# Patient Record
Sex: FEMALE | Race: WHITE | Employment: PART TIME | URBAN - METROPOLITAN AREA
[De-identification: names, ages, dates, MRNs, and addresses within clinical notes are randomized per-mention and may not be internally consistent; named-entity substitution may affect disease eponyms.]

---

## 2017-12-04 PROBLEM — Z34.93 ENCOUNTER FOR PREGNANCY RELATED EXAMINATION IN THIRD TRIMESTER: Status: ACTIVE | Noted: 2017-12-04

## 2017-12-07 PROBLEM — O46.93 PREGNANCY WITH THIRD TRIMESTER BLEEDING, ANTEPARTUM: Status: ACTIVE | Noted: 2017-12-07

## 2017-12-07 PROBLEM — Z37.9 NORMAL LABOR: Status: ACTIVE | Noted: 2017-12-07

## 2017-12-07 PROBLEM — Z34.93 ENCOUNTER FOR PREGNANCY RELATED EXAMINATION IN THIRD TRIMESTER: Status: RESOLVED | Noted: 2017-12-04 | Resolved: 2017-12-07

## 2017-12-07 PROBLEM — Z3A.40 40 WEEKS GESTATION OF PREGNANCY: Status: ACTIVE | Noted: 2017-12-07

## 2018-10-04 ENCOUNTER — HOSPITAL ENCOUNTER (OUTPATIENT)
Age: 28
Discharge: HOME | End: 2018-10-04
Attending: OBSTETRICS & GYNECOLOGY | Admitting: OBSTETRICS & GYNECOLOGY
Payer: COMMERCIAL

## 2018-10-04 VITALS
HEART RATE: 83 BPM | BODY MASS INDEX: 45.32 KG/M2 | RESPIRATION RATE: 18 BRPM | DIASTOLIC BLOOD PRESSURE: 74 MMHG | TEMPERATURE: 97.5 F | SYSTOLIC BLOOD PRESSURE: 105 MMHG | WEIGHT: 272 LBS | HEIGHT: 65 IN

## 2018-10-04 PROBLEM — Z32.01 PREGNANCY EXAMINATION OR TEST, POSITIVE RESULT: Status: ACTIVE | Noted: 2018-10-04

## 2018-10-04 PROCEDURE — 99211 OFF/OP EST MAY X REQ PHY/QHP: CPT

## 2018-10-04 RX ORDER — PNV,CALCIUM 72/IRON/FOLIC ACID 27 MG-1 MG
TABLET ORAL
COMMUNITY
Start: 2018-09-19 | End: 2021-06-08 | Stop reason: ALTCHOICE

## 2018-10-04 NOTE — FLOWSHEET NOTE
TR to Dr. Andre Boles. Informed of pt presence, c/o, assessment, FHR and history. Discharge order received. Pt to follow up in the office next week. Simran Garcia

## 2019-01-09 ENCOUNTER — HOSPITAL ENCOUNTER (OUTPATIENT)
Age: 29
Discharge: HOME | End: 2019-01-09
Attending: OBSTETRICS & GYNECOLOGY | Admitting: OBSTETRICS & GYNECOLOGY
Payer: COMMERCIAL

## 2019-01-09 VITALS
HEART RATE: 81 BPM | WEIGHT: 273 LBS | TEMPERATURE: 97 F | BODY MASS INDEX: 45.48 KG/M2 | DIASTOLIC BLOOD PRESSURE: 60 MMHG | RESPIRATION RATE: 16 BRPM | SYSTOLIC BLOOD PRESSURE: 116 MMHG | HEIGHT: 65 IN

## 2019-01-09 PROBLEM — Z32.02 PREGNANCY EXAMINATION OR TEST, NEGATIVE RESULT: Status: ACTIVE | Noted: 2019-01-09

## 2019-01-09 LAB
AMPHETAMINES: NEGATIVE
BACTERIA: ABNORMAL /HPF
BARBITURATE SCREEN URINE: NEGATIVE
BENZODIAZEPINE SCREEN, URINE: NEGATIVE
BILIRUBIN URINE: NEGATIVE MG/DL
BLOOD, URINE: NEGATIVE
CANNABINOID SCREEN URINE: NEGATIVE
CLARITY: ABNORMAL
COCAINE METABOLITE: NEGATIVE
COLOR: YELLOW
GLUCOSE, URINE: NEGATIVE MG/DL
KETONES, URINE: NEGATIVE MG/DL
LEUKOCYTE ESTERASE, URINE: ABNORMAL
MUCUS: ABNORMAL HPF
NITRITE URINE, QUANTITATIVE: NEGATIVE
OPIATES, URINE: NEGATIVE
OXYCODONE: NEGATIVE
PH, URINE: 6 (ref 5–8)
PHENCYCLIDINE, URINE: NEGATIVE
PROTEIN UA: NEGATIVE MG/DL
RBC URINE: <1 /HPF (ref 0–6)
SPECIFIC GRAVITY UA: 1.01 (ref 1–1.03)
SQUAMOUS EPITHELIAL: 18 /HPF
TRICHOMONAS: ABNORMAL /HPF
UROBILINOGEN, URINE: NORMAL MG/DL (ref 0.2–1)
WBC UA: 5 /HPF (ref 0–5)

## 2019-01-09 PROCEDURE — 81001 URINALYSIS AUTO W/SCOPE: CPT

## 2019-01-09 PROCEDURE — 99211 OFF/OP EST MAY X REQ PHY/QHP: CPT

## 2019-01-09 PROCEDURE — 80307 DRUG TEST PRSMV CHEM ANLYZR: CPT

## 2019-03-08 ENCOUNTER — ANESTHESIA EVENT (OUTPATIENT)
Dept: LABOR AND DELIVERY | Age: 29
End: 2019-03-08
Payer: COMMERCIAL

## 2019-03-09 ENCOUNTER — HOSPITAL ENCOUNTER (INPATIENT)
Age: 29
LOS: 3 days | Discharge: HOME | End: 2019-03-12
Attending: OBSTETRICS & GYNECOLOGY | Admitting: OBSTETRICS & GYNECOLOGY
Payer: COMMERCIAL

## 2019-03-09 ENCOUNTER — ANESTHESIA (OUTPATIENT)
Dept: LABOR AND DELIVERY | Age: 29
End: 2019-03-09
Payer: COMMERCIAL

## 2019-03-09 VITALS — SYSTOLIC BLOOD PRESSURE: 132 MMHG | OXYGEN SATURATION: 100 % | DIASTOLIC BLOOD PRESSURE: 55 MMHG | TEMPERATURE: 96.8 F

## 2019-03-09 DIAGNOSIS — G89.18 POST-OPERATIVE PAIN: Primary | ICD-10-CM

## 2019-03-09 PROBLEM — Z3A.39 39 WEEKS GESTATION OF PREGNANCY: Status: ACTIVE | Noted: 2019-03-09

## 2019-03-09 PROBLEM — O09.299 HX OF POSTPARTUM HEMORRHAGE, CURRENTLY PREGNANT: Status: ACTIVE | Noted: 2019-03-09

## 2019-03-09 PROBLEM — O36.60X0 LGA (LARGE FOR GESTATIONAL AGE) FETUS AFFECTING MANAGEMENT OF MOTHER: Status: ACTIVE | Noted: 2019-03-09

## 2019-03-09 LAB
AMPHETAMINES: NEGATIVE
BACTERIA: ABNORMAL /HPF
BARBITURATE SCREEN URINE: NEGATIVE
BENZODIAZEPINE SCREEN, URINE: NEGATIVE
BILIRUBIN URINE: NEGATIVE MG/DL
BLOOD, URINE: NEGATIVE
CANNABINOID SCREEN URINE: NEGATIVE
CLARITY: ABNORMAL
COCAINE METABOLITE: NEGATIVE
COLOR: YELLOW
GLUCOSE, URINE: NEGATIVE MG/DL
HCT VFR BLD CALC: 35.6 % (ref 37–47)
HCT VFR BLD CALC: 41.9 % (ref 37–47)
HEMOGLOBIN: 11.6 GM/DL (ref 12.5–16)
HEMOGLOBIN: 13.9 GM/DL (ref 12.5–16)
KETONES, URINE: ABNORMAL MG/DL
LEUKOCYTE ESTERASE, URINE: ABNORMAL
MCH RBC QN AUTO: 28.9 PG (ref 27–31)
MCHC RBC AUTO-ENTMCNC: 33.2 % (ref 32–36)
MCV RBC AUTO: 87.1 FL (ref 78–100)
MUCUS: ABNORMAL HPF
NITRITE URINE, QUANTITATIVE: NEGATIVE
OPIATES, URINE: NEGATIVE
OXYCODONE: NEGATIVE
PDW BLD-RTO: 14.3 % (ref 11.7–14.9)
PH, URINE: 6 (ref 5–8)
PHENCYCLIDINE, URINE: NEGATIVE
PLATELET # BLD: 181 K/CU MM (ref 140–440)
PMV BLD AUTO: 11.1 FL (ref 7.5–11.1)
PROTEIN UA: NEGATIVE MG/DL
RBC # BLD: 4.81 M/CU MM (ref 4.2–5.4)
RBC URINE: 5 /HPF (ref 0–6)
SPECIFIC GRAVITY UA: 1.01 (ref 1–1.03)
SQUAMOUS EPITHELIAL: 36 /HPF
TRICHOMONAS: ABNORMAL /HPF
UROBILINOGEN, URINE: NORMAL MG/DL (ref 0.2–1)
WBC # BLD: 9.2 K/CU MM (ref 4–10.5)
WBC CLUMP: ABNORMAL /HPF
WBC UA: 14 /HPF (ref 0–5)

## 2019-03-09 PROCEDURE — 85018 HEMOGLOBIN: CPT

## 2019-03-09 PROCEDURE — 3700000000 HC ANESTHESIA ATTENDED CARE: Performed by: OBSTETRICS & GYNECOLOGY

## 2019-03-09 PROCEDURE — 2500000003 HC RX 250 WO HCPCS: Performed by: NURSE ANESTHETIST, CERTIFIED REGISTERED

## 2019-03-09 PROCEDURE — 86900 BLOOD TYPING SEROLOGIC ABO: CPT

## 2019-03-09 PROCEDURE — 86901 BLOOD TYPING SEROLOGIC RH(D): CPT

## 2019-03-09 PROCEDURE — 86850 RBC ANTIBODY SCREEN: CPT

## 2019-03-09 PROCEDURE — 36415 COLL VENOUS BLD VENIPUNCTURE: CPT

## 2019-03-09 PROCEDURE — 2580000003 HC RX 258: Performed by: OBSTETRICS & GYNECOLOGY

## 2019-03-09 PROCEDURE — 81001 URINALYSIS AUTO W/SCOPE: CPT

## 2019-03-09 PROCEDURE — 7100000001 HC PACU RECOVERY - ADDTL 15 MIN: Performed by: OBSTETRICS & GYNECOLOGY

## 2019-03-09 PROCEDURE — 3609079900 HC CESAREAN SECTION: Performed by: OBSTETRICS & GYNECOLOGY

## 2019-03-09 PROCEDURE — 85014 HEMATOCRIT: CPT

## 2019-03-09 PROCEDURE — 3700000001 HC ADD 15 MINUTES (ANESTHESIA): Performed by: OBSTETRICS & GYNECOLOGY

## 2019-03-09 PROCEDURE — 7100000000 HC PACU RECOVERY - FIRST 15 MIN: Performed by: OBSTETRICS & GYNECOLOGY

## 2019-03-09 PROCEDURE — 80307 DRUG TEST PRSMV CHEM ANLYZR: CPT

## 2019-03-09 PROCEDURE — 6370000000 HC RX 637 (ALT 250 FOR IP): Performed by: OBSTETRICS & GYNECOLOGY

## 2019-03-09 PROCEDURE — 6360000002 HC RX W HCPCS: Performed by: OBSTETRICS & GYNECOLOGY

## 2019-03-09 PROCEDURE — 2580000003 HC RX 258

## 2019-03-09 PROCEDURE — 85027 COMPLETE CBC AUTOMATED: CPT

## 2019-03-09 PROCEDURE — 2580000003 HC RX 258: Performed by: NURSE ANESTHETIST, CERTIFIED REGISTERED

## 2019-03-09 PROCEDURE — 1220000000 HC SEMI PRIVATE OB R&B

## 2019-03-09 PROCEDURE — 6360000002 HC RX W HCPCS: Performed by: NURSE ANESTHETIST, CERTIFIED REGISTERED

## 2019-03-09 RX ORDER — LANOLIN 100 %
OINTMENT (GRAM) TOPICAL
Status: DISCONTINUED | OUTPATIENT
Start: 2019-03-09 | End: 2019-03-12 | Stop reason: HOSPADM

## 2019-03-09 RX ORDER — SODIUM CHLORIDE, SODIUM LACTATE, POTASSIUM CHLORIDE, CALCIUM CHLORIDE 600; 310; 30; 20 MG/100ML; MG/100ML; MG/100ML; MG/100ML
INJECTION, SOLUTION INTRAVENOUS CONTINUOUS PRN
Status: DISCONTINUED | OUTPATIENT
Start: 2019-03-09 | End: 2019-03-09 | Stop reason: SDUPTHER

## 2019-03-09 RX ORDER — IBUPROFEN 800 MG/1
800 TABLET ORAL EVERY 8 HOURS PRN
Status: DISCONTINUED | OUTPATIENT
Start: 2019-03-09 | End: 2019-03-12 | Stop reason: HOSPADM

## 2019-03-09 RX ORDER — OXYCODONE HYDROCHLORIDE AND ACETAMINOPHEN 5; 325 MG/1; MG/1
1 TABLET ORAL EVERY 4 HOURS PRN
Status: DISCONTINUED | OUTPATIENT
Start: 2019-03-09 | End: 2019-03-12 | Stop reason: HOSPADM

## 2019-03-09 RX ORDER — BISACODYL 10 MG
10 SUPPOSITORY, RECTAL RECTAL DAILY PRN
Status: DISCONTINUED | OUTPATIENT
Start: 2019-03-09 | End: 2019-03-12 | Stop reason: HOSPADM

## 2019-03-09 RX ORDER — SODIUM CHLORIDE, SODIUM LACTATE, POTASSIUM CHLORIDE, CALCIUM CHLORIDE 600; 310; 30; 20 MG/100ML; MG/100ML; MG/100ML; MG/100ML
INJECTION, SOLUTION INTRAVENOUS CONTINUOUS
Status: DISCONTINUED | OUTPATIENT
Start: 2019-03-09 | End: 2019-03-12 | Stop reason: HOSPADM

## 2019-03-09 RX ORDER — SODIUM CHLORIDE, SODIUM LACTATE, POTASSIUM CHLORIDE, AND CALCIUM CHLORIDE .6; .31; .03; .02 G/100ML; G/100ML; G/100ML; G/100ML
1000 INJECTION, SOLUTION INTRAVENOUS ONCE
Status: COMPLETED | OUTPATIENT
Start: 2019-03-09 | End: 2019-03-09

## 2019-03-09 RX ORDER — FERROUS SULFATE 325(65) MG
325 TABLET ORAL 2 TIMES DAILY WITH MEALS
Status: DISCONTINUED | OUTPATIENT
Start: 2019-03-09 | End: 2019-03-12 | Stop reason: HOSPADM

## 2019-03-09 RX ORDER — SODIUM CHLORIDE 0.9 % (FLUSH) 0.9 %
10 SYRINGE (ML) INJECTION PRN
Status: DISCONTINUED | OUTPATIENT
Start: 2019-03-09 | End: 2019-03-12 | Stop reason: HOSPADM

## 2019-03-09 RX ORDER — ONDANSETRON 2 MG/ML
INJECTION INTRAMUSCULAR; INTRAVENOUS PRN
Status: DISCONTINUED | OUTPATIENT
Start: 2019-03-09 | End: 2019-03-09

## 2019-03-09 RX ORDER — KETOROLAC TROMETHAMINE 30 MG/ML
INJECTION, SOLUTION INTRAMUSCULAR; INTRAVENOUS PRN
Status: DISCONTINUED | OUTPATIENT
Start: 2019-03-09 | End: 2019-03-09

## 2019-03-09 RX ORDER — KETOROLAC TROMETHAMINE 30 MG/ML
30 INJECTION, SOLUTION INTRAMUSCULAR; INTRAVENOUS EVERY 6 HOURS PRN
Status: DISCONTINUED | OUTPATIENT
Start: 2019-03-09 | End: 2019-03-09

## 2019-03-09 RX ORDER — CEFAZOLIN SODIUM 2 G/100ML
2 INJECTION, SOLUTION INTRAVENOUS ONCE
Status: DISCONTINUED | OUTPATIENT
Start: 2019-03-09 | End: 2019-03-09

## 2019-03-09 RX ORDER — METHYLERGONOVINE MALEATE 0.2 MG/ML
200 INJECTION INTRAVENOUS PRN
Status: DISCONTINUED | OUTPATIENT
Start: 2019-03-09 | End: 2019-03-12 | Stop reason: HOSPADM

## 2019-03-09 RX ORDER — OXYCODONE HYDROCHLORIDE AND ACETAMINOPHEN 5; 325 MG/1; MG/1
2 TABLET ORAL EVERY 4 HOURS PRN
Status: DISCONTINUED | OUTPATIENT
Start: 2019-03-09 | End: 2019-03-12 | Stop reason: HOSPADM

## 2019-03-09 RX ORDER — ONDANSETRON 4 MG/1
8 TABLET, ORALLY DISINTEGRATING ORAL EVERY 8 HOURS PRN
Status: DISCONTINUED | OUTPATIENT
Start: 2019-03-09 | End: 2019-03-12 | Stop reason: HOSPADM

## 2019-03-09 RX ORDER — METHYLERGONOVINE MALEATE 0.2 MG/ML
200 INJECTION INTRAVENOUS PRN
Status: DISCONTINUED | OUTPATIENT
Start: 2019-03-09 | End: 2019-03-09

## 2019-03-09 RX ORDER — ONDANSETRON 2 MG/ML
4 INJECTION INTRAMUSCULAR; INTRAVENOUS EVERY 6 HOURS PRN
Status: DISCONTINUED | OUTPATIENT
Start: 2019-03-09 | End: 2019-03-09

## 2019-03-09 RX ORDER — BUPIVACAINE HYDROCHLORIDE 7.5 MG/ML
INJECTION, SOLUTION INTRASPINAL PRN
Status: DISCONTINUED | OUTPATIENT
Start: 2019-03-09 | End: 2019-03-09 | Stop reason: SDUPTHER

## 2019-03-09 RX ORDER — SODIUM CHLORIDE, SODIUM LACTATE, POTASSIUM CHLORIDE, CALCIUM CHLORIDE 600; 310; 30; 20 MG/100ML; MG/100ML; MG/100ML; MG/100ML
INJECTION, SOLUTION INTRAVENOUS
Status: COMPLETED
Start: 2019-03-09 | End: 2019-03-09

## 2019-03-09 RX ORDER — SIMETHICONE 80 MG
80 TABLET,CHEWABLE ORAL EVERY 6 HOURS PRN
Status: DISCONTINUED | OUTPATIENT
Start: 2019-03-09 | End: 2019-03-12 | Stop reason: HOSPADM

## 2019-03-09 RX ORDER — EPHEDRINE SULFATE 50 MG/ML
INJECTION, SOLUTION INTRAVENOUS PRN
Status: DISCONTINUED | OUTPATIENT
Start: 2019-03-09 | End: 2019-03-09 | Stop reason: SDUPTHER

## 2019-03-09 RX ORDER — NALBUPHINE HCL 10 MG/ML
5 AMPUL (ML) INJECTION
Status: DISCONTINUED | OUTPATIENT
Start: 2019-03-09 | End: 2019-03-12 | Stop reason: HOSPADM

## 2019-03-09 RX ORDER — SODIUM CHLORIDE 0.9 % (FLUSH) 0.9 %
10 SYRINGE (ML) INJECTION EVERY 12 HOURS SCHEDULED
Status: DISCONTINUED | OUTPATIENT
Start: 2019-03-09 | End: 2019-03-12 | Stop reason: HOSPADM

## 2019-03-09 RX ORDER — MORPHINE SULFATE 0.5 MG/ML
INJECTION, SOLUTION EPIDURAL; INTRATHECAL; INTRAVENOUS PRN
Status: DISCONTINUED | OUTPATIENT
Start: 2019-03-09 | End: 2019-03-09 | Stop reason: SDUPTHER

## 2019-03-09 RX ORDER — TRISODIUM CITRATE DIHYDRATE AND CITRIC ACID MONOHYDRATE 500; 334 MG/5ML; MG/5ML
30 SOLUTION ORAL ONCE
Status: COMPLETED | OUTPATIENT
Start: 2019-03-09 | End: 2019-03-09

## 2019-03-09 RX ORDER — DOCUSATE SODIUM 100 MG/1
100 CAPSULE, LIQUID FILLED ORAL 2 TIMES DAILY
Status: DISCONTINUED | OUTPATIENT
Start: 2019-03-09 | End: 2019-03-12 | Stop reason: HOSPADM

## 2019-03-09 RX ORDER — SODIUM CHLORIDE 0.9 % (FLUSH) 0.9 %
10 SYRINGE (ML) INJECTION PRN
Status: DISCONTINUED | OUTPATIENT
Start: 2019-03-09 | End: 2019-03-09 | Stop reason: HOSPADM

## 2019-03-09 RX ADMIN — DOCUSATE SODIUM 100 MG: 100 CAPSULE, LIQUID FILLED ORAL at 20:21

## 2019-03-09 RX ADMIN — NALBUPHINE HYDROCHLORIDE 5 MG: 10 INJECTION, SOLUTION INTRAMUSCULAR; INTRAVENOUS; SUBCUTANEOUS at 21:08

## 2019-03-09 RX ADMIN — ONDANSETRON 4 MG: 2 INJECTION INTRAMUSCULAR; INTRAVENOUS at 07:30

## 2019-03-09 RX ADMIN — Medication 999 ML/HR: at 08:22

## 2019-03-09 RX ADMIN — BUPIVACAINE HYDROCHLORIDE IN DEXTROSE 1.6 ML: 7.5 INJECTION, SOLUTION SUBARACHNOID at 07:55

## 2019-03-09 RX ADMIN — SODIUM CHLORIDE, SODIUM LACTATE, POTASSIUM CHLORIDE, AND CALCIUM CHLORIDE 1000 ML: .6; .31; .03; .02 INJECTION, SOLUTION INTRAVENOUS at 06:29

## 2019-03-09 RX ADMIN — MORPHINE SULFATE 4.8 MG: 0.5 INJECTION, SOLUTION EPIDURAL; INTRATHECAL; INTRAVENOUS at 08:27

## 2019-03-09 RX ADMIN — SODIUM CITRATE AND CITRIC ACID MONOHYDRATE 30 ML: 500; 334 SOLUTION ORAL at 07:28

## 2019-03-09 RX ADMIN — NALBUPHINE HYDROCHLORIDE 5 MG: 10 INJECTION, SOLUTION INTRAMUSCULAR; INTRAVENOUS; SUBCUTANEOUS at 15:17

## 2019-03-09 RX ADMIN — KETOROLAC TROMETHAMINE 30 MG: 30 INJECTION, SOLUTION INTRAMUSCULAR; INTRAVENOUS at 08:43

## 2019-03-09 RX ADMIN — EPHEDRINE SULFATE 10 MG: 50 INJECTION, SOLUTION INTRAMUSCULAR; INTRAVENOUS; SUBCUTANEOUS at 08:25

## 2019-03-09 RX ADMIN — SODIUM CHLORIDE, POTASSIUM CHLORIDE, SODIUM LACTATE AND CALCIUM CHLORIDE 1000 ML: 600; 310; 30; 20 INJECTION, SOLUTION INTRAVENOUS at 06:29

## 2019-03-09 RX ADMIN — MORPHINE SULFATE 0.2 MG: 0.5 INJECTION, SOLUTION EPIDURAL; INTRATHECAL; INTRAVENOUS at 07:55

## 2019-03-09 RX ADMIN — SODIUM CHLORIDE, POTASSIUM CHLORIDE, SODIUM LACTATE AND CALCIUM CHLORIDE: 600; 310; 30; 20 INJECTION, SOLUTION INTRAVENOUS at 09:45

## 2019-03-09 RX ADMIN — EPHEDRINE SULFATE 10 MG: 50 INJECTION, SOLUTION INTRAMUSCULAR; INTRAVENOUS; SUBCUTANEOUS at 08:30

## 2019-03-09 RX ADMIN — NALBUPHINE HYDROCHLORIDE 5 MG: 10 INJECTION, SOLUTION INTRAMUSCULAR; INTRAVENOUS; SUBCUTANEOUS at 10:45

## 2019-03-09 RX ADMIN — DEXTROSE MONOHYDRATE 3 G: 50 INJECTION, SOLUTION INTRAVENOUS at 07:32

## 2019-03-09 RX ADMIN — SODIUM CHLORIDE, POTASSIUM CHLORIDE, SODIUM LACTATE AND CALCIUM CHLORIDE: 600; 310; 30; 20 INJECTION, SOLUTION INTRAVENOUS at 20:21

## 2019-03-09 RX ADMIN — SODIUM CHLORIDE, POTASSIUM CHLORIDE, SODIUM LACTATE AND CALCIUM CHLORIDE: 600; 310; 30; 20 INJECTION, SOLUTION INTRAVENOUS at 13:04

## 2019-03-09 RX ADMIN — SODIUM CHLORIDE, POTASSIUM CHLORIDE, SODIUM LACTATE AND CALCIUM CHLORIDE: 600; 310; 30; 20 INJECTION, SOLUTION INTRAVENOUS at 07:48

## 2019-03-09 RX ADMIN — EPHEDRINE SULFATE 15 MG: 50 INJECTION, SOLUTION INTRAMUSCULAR; INTRAVENOUS; SUBCUTANEOUS at 08:08

## 2019-03-09 RX ADMIN — EPHEDRINE SULFATE 15 MG: 50 INJECTION, SOLUTION INTRAMUSCULAR; INTRAVENOUS; SUBCUTANEOUS at 08:00

## 2019-03-09 RX ADMIN — ONDANSETRON 4 MG: 2 INJECTION INTRAMUSCULAR; INTRAVENOUS at 08:43

## 2019-03-09 ASSESSMENT — PAIN SCALES - GENERAL
PAINLEVEL_OUTOF10: 4
PAINLEVEL_OUTOF10: 1
PAINLEVEL_OUTOF10: 0
PAINLEVEL_OUTOF10: 4
PAINLEVEL_OUTOF10: 2
PAINLEVEL_OUTOF10: 2

## 2019-03-09 ASSESSMENT — PAIN DESCRIPTION - LOCATION: LOCATION: ABDOMEN

## 2019-03-09 ASSESSMENT — PAIN DESCRIPTION - PROGRESSION: CLINICAL_PROGRESSION: NOT CHANGED

## 2019-03-09 ASSESSMENT — PULMONARY FUNCTION TESTS
PIF_VALUE: 0
PIF_VALUE: 1
PIF_VALUE: 0
PIF_VALUE: 1
PIF_VALUE: 0

## 2019-03-09 ASSESSMENT — PAIN DESCRIPTION - DESCRIPTORS: DESCRIPTORS: BURNING

## 2019-03-09 ASSESSMENT — PAIN DESCRIPTION - PAIN TYPE: TYPE: SURGICAL PAIN

## 2019-03-09 ASSESSMENT — PAIN - FUNCTIONAL ASSESSMENT: PAIN_FUNCTIONAL_ASSESSMENT: ACTIVITIES ARE NOT PREVENTED

## 2019-03-09 ASSESSMENT — PAIN DESCRIPTION - ORIENTATION: ORIENTATION: LOWER

## 2019-03-09 ASSESSMENT — PAIN DESCRIPTION - FREQUENCY: FREQUENCY: INTERMITTENT

## 2019-03-09 ASSESSMENT — PAIN DESCRIPTION - ONSET: ONSET: GRADUAL

## 2019-03-10 LAB
BASOPHILS ABSOLUTE: 0 K/CU MM
BASOPHILS RELATIVE PERCENT: 0.5 % (ref 0–1)
DIFFERENTIAL TYPE: ABNORMAL
EOSINOPHILS ABSOLUTE: 0.1 K/CU MM
EOSINOPHILS RELATIVE PERCENT: 0.9 % (ref 0–3)
HCT VFR BLD CALC: 37.7 % (ref 37–47)
HEMOGLOBIN: 11.9 GM/DL (ref 12.5–16)
IMMATURE NEUTROPHIL %: 0.7 % (ref 0–0.43)
LYMPHOCYTES ABSOLUTE: 2.1 K/CU MM
LYMPHOCYTES RELATIVE PERCENT: 24.1 % (ref 24–44)
MCH RBC QN AUTO: 28.6 PG (ref 27–31)
MCHC RBC AUTO-ENTMCNC: 31.6 % (ref 32–36)
MCV RBC AUTO: 90.6 FL (ref 78–100)
MONOCYTES ABSOLUTE: 0.5 K/CU MM
MONOCYTES RELATIVE PERCENT: 6.2 % (ref 0–4)
NUCLEATED RBC %: 0 %
PDW BLD-RTO: 14.6 % (ref 11.7–14.9)
PLATELET # BLD: 169 K/CU MM (ref 140–440)
PMV BLD AUTO: 11.6 FL (ref 7.5–11.1)
RBC # BLD: 4.16 M/CU MM (ref 4.2–5.4)
SEGMENTED NEUTROPHILS ABSOLUTE COUNT: 5.9 K/CU MM
SEGMENTED NEUTROPHILS RELATIVE PERCENT: 67.6 % (ref 36–66)
TOTAL IMMATURE NEUTOROPHIL: 0.06 K/CU MM
TOTAL NUCLEATED RBC: 0 K/CU MM
WBC # BLD: 8.8 K/CU MM (ref 4–10.5)

## 2019-03-10 PROCEDURE — 1220000000 HC SEMI PRIVATE OB R&B

## 2019-03-10 PROCEDURE — 6360000002 HC RX W HCPCS: Performed by: OBSTETRICS & GYNECOLOGY

## 2019-03-10 PROCEDURE — 6370000000 HC RX 637 (ALT 250 FOR IP): Performed by: OBSTETRICS & GYNECOLOGY

## 2019-03-10 PROCEDURE — 36415 COLL VENOUS BLD VENIPUNCTURE: CPT

## 2019-03-10 PROCEDURE — 85025 COMPLETE CBC W/AUTO DIFF WBC: CPT

## 2019-03-10 RX ADMIN — OXYCODONE AND ACETAMINOPHEN 1 TABLET: 5; 325 TABLET ORAL at 10:55

## 2019-03-10 RX ADMIN — SIMETHICONE CHEW TAB 80 MG 80 MG: 80 TABLET ORAL at 20:47

## 2019-03-10 RX ADMIN — ENOXAPARIN SODIUM 40 MG: 40 INJECTION SUBCUTANEOUS at 20:47

## 2019-03-10 RX ADMIN — DOCUSATE SODIUM 100 MG: 100 CAPSULE, LIQUID FILLED ORAL at 08:50

## 2019-03-10 RX ADMIN — OXYCODONE AND ACETAMINOPHEN 1 TABLET: 5; 325 TABLET ORAL at 20:47

## 2019-03-10 RX ADMIN — ENOXAPARIN SODIUM 40 MG: 40 INJECTION SUBCUTANEOUS at 00:00

## 2019-03-10 RX ADMIN — IBUPROFEN 800 MG: 800 TABLET, FILM COATED ORAL at 08:51

## 2019-03-10 RX ADMIN — IBUPROFEN 800 MG: 800 TABLET, FILM COATED ORAL at 16:49

## 2019-03-10 RX ADMIN — OXYCODONE AND ACETAMINOPHEN 2 TABLET: 5; 325 TABLET ORAL at 05:24

## 2019-03-10 RX ADMIN — OXYCODONE AND ACETAMINOPHEN 1 TABLET: 5; 325 TABLET ORAL at 15:14

## 2019-03-10 RX ADMIN — DOCUSATE SODIUM 100 MG: 100 CAPSULE, LIQUID FILLED ORAL at 20:47

## 2019-03-10 RX ADMIN — OXYCODONE AND ACETAMINOPHEN 2 TABLET: 5; 325 TABLET ORAL at 00:12

## 2019-03-10 RX ADMIN — SIMETHICONE CHEW TAB 80 MG 80 MG: 80 TABLET ORAL at 08:52

## 2019-03-10 ASSESSMENT — PAIN SCALES - GENERAL
PAINLEVEL_OUTOF10: 3
PAINLEVEL_OUTOF10: 2
PAINLEVEL_OUTOF10: 4
PAINLEVEL_OUTOF10: 7
PAINLEVEL_OUTOF10: 5
PAINLEVEL_OUTOF10: 1
PAINLEVEL_OUTOF10: 4
PAINLEVEL_OUTOF10: 7
PAINLEVEL_OUTOF10: 2

## 2019-03-10 ASSESSMENT — PAIN DESCRIPTION - ORIENTATION
ORIENTATION: LOWER

## 2019-03-10 ASSESSMENT — PAIN - FUNCTIONAL ASSESSMENT
PAIN_FUNCTIONAL_ASSESSMENT: ACTIVITIES ARE NOT PREVENTED
PAIN_FUNCTIONAL_ASSESSMENT: PREVENTS OR INTERFERES SOME ACTIVE ACTIVITIES AND ADLS

## 2019-03-10 ASSESSMENT — PAIN DESCRIPTION - PAIN TYPE
TYPE: SURGICAL PAIN
TYPE: SURGICAL PAIN

## 2019-03-10 ASSESSMENT — PAIN DESCRIPTION - DESCRIPTORS
DESCRIPTORS: BURNING

## 2019-03-10 ASSESSMENT — PAIN DESCRIPTION - LOCATION
LOCATION: ABDOMEN

## 2019-03-10 ASSESSMENT — PAIN DESCRIPTION - FREQUENCY
FREQUENCY: INTERMITTENT
FREQUENCY: INTERMITTENT

## 2019-03-10 ASSESSMENT — PAIN DESCRIPTION - ONSET
ONSET: ON-GOING
ONSET: ON-GOING

## 2019-03-11 PROCEDURE — 6360000002 HC RX W HCPCS: Performed by: OBSTETRICS & GYNECOLOGY

## 2019-03-11 PROCEDURE — 94761 N-INVAS EAR/PLS OXIMETRY MLT: CPT

## 2019-03-11 PROCEDURE — 1220000000 HC SEMI PRIVATE OB R&B

## 2019-03-11 PROCEDURE — 6370000000 HC RX 637 (ALT 250 FOR IP): Performed by: OBSTETRICS & GYNECOLOGY

## 2019-03-11 RX ADMIN — SIMETHICONE CHEW TAB 80 MG 80 MG: 80 TABLET ORAL at 08:36

## 2019-03-11 RX ADMIN — DOCUSATE SODIUM 100 MG: 100 CAPSULE, LIQUID FILLED ORAL at 21:18

## 2019-03-11 RX ADMIN — IBUPROFEN 800 MG: 800 TABLET, FILM COATED ORAL at 15:43

## 2019-03-11 RX ADMIN — DOCUSATE SODIUM 100 MG: 100 CAPSULE, LIQUID FILLED ORAL at 08:35

## 2019-03-11 RX ADMIN — OXYCODONE AND ACETAMINOPHEN 1 TABLET: 5; 325 TABLET ORAL at 13:48

## 2019-03-11 RX ADMIN — IBUPROFEN 800 MG: 800 TABLET, FILM COATED ORAL at 02:12

## 2019-03-11 RX ADMIN — ENOXAPARIN SODIUM 40 MG: 40 INJECTION SUBCUTANEOUS at 21:18

## 2019-03-11 RX ADMIN — OXYCODONE AND ACETAMINOPHEN 1 TABLET: 5; 325 TABLET ORAL at 08:40

## 2019-03-11 RX ADMIN — OXYCODONE AND ACETAMINOPHEN 2 TABLET: 5; 325 TABLET ORAL at 21:38

## 2019-03-11 ASSESSMENT — PAIN SCALES - GENERAL
PAINLEVEL_OUTOF10: 4
PAINLEVEL_OUTOF10: 7
PAINLEVEL_OUTOF10: 6
PAINLEVEL_OUTOF10: 3
PAINLEVEL_OUTOF10: 3

## 2019-03-12 VITALS
TEMPERATURE: 97.9 F | HEIGHT: 65 IN | WEIGHT: 276 LBS | SYSTOLIC BLOOD PRESSURE: 122 MMHG | BODY MASS INDEX: 45.98 KG/M2 | HEART RATE: 65 BPM | RESPIRATION RATE: 18 BRPM | DIASTOLIC BLOOD PRESSURE: 67 MMHG | OXYGEN SATURATION: 98 %

## 2019-03-12 PROBLEM — Z3A.40 40 WEEKS GESTATION OF PREGNANCY: Status: RESOLVED | Noted: 2017-12-07 | Resolved: 2019-03-12

## 2019-03-12 PROBLEM — Z37.9 NORMAL LABOR: Status: RESOLVED | Noted: 2017-12-07 | Resolved: 2019-03-12

## 2019-03-12 PROBLEM — O46.93 PREGNANCY WITH THIRD TRIMESTER BLEEDING, ANTEPARTUM: Status: RESOLVED | Noted: 2017-12-07 | Resolved: 2019-03-12

## 2019-03-12 PROBLEM — Z32.02 PREGNANCY EXAMINATION OR TEST, NEGATIVE RESULT: Status: RESOLVED | Noted: 2019-01-09 | Resolved: 2019-03-12

## 2019-03-12 PROBLEM — Z32.01 PREGNANCY EXAMINATION OR TEST, POSITIVE RESULT: Status: RESOLVED | Noted: 2018-10-04 | Resolved: 2019-03-12

## 2019-03-12 PROCEDURE — 6370000000 HC RX 637 (ALT 250 FOR IP): Performed by: OBSTETRICS & GYNECOLOGY

## 2019-03-12 RX ORDER — IBUPROFEN 800 MG/1
800 TABLET ORAL EVERY 8 HOURS PRN
Qty: 60 TABLET | Refills: 3 | Status: SHIPPED | OUTPATIENT
Start: 2019-03-12 | End: 2021-06-08 | Stop reason: ALTCHOICE

## 2019-03-12 RX ORDER — OXYCODONE HYDROCHLORIDE AND ACETAMINOPHEN 5; 325 MG/1; MG/1
1 TABLET ORAL EVERY 6 HOURS PRN
Qty: 20 TABLET | Refills: 0 | Status: SHIPPED | OUTPATIENT
Start: 2019-03-12 | End: 2019-03-19

## 2019-03-12 RX ADMIN — OXYCODONE AND ACETAMINOPHEN 2 TABLET: 5; 325 TABLET ORAL at 14:16

## 2019-03-12 RX ADMIN — IBUPROFEN 800 MG: 800 TABLET, FILM COATED ORAL at 07:25

## 2019-03-12 RX ADMIN — DOCUSATE SODIUM 100 MG: 100 CAPSULE, LIQUID FILLED ORAL at 08:45

## 2019-03-12 ASSESSMENT — PAIN DESCRIPTION - ORIENTATION: ORIENTATION: MID

## 2019-03-12 ASSESSMENT — PAIN DESCRIPTION - PROGRESSION
CLINICAL_PROGRESSION: GRADUALLY IMPROVING
CLINICAL_PROGRESSION: GRADUALLY WORSENING

## 2019-03-12 ASSESSMENT — PAIN DESCRIPTION - LOCATION: LOCATION: ABDOMEN

## 2019-03-12 ASSESSMENT — PAIN DESCRIPTION - PAIN TYPE: TYPE: ACUTE PAIN;SURGICAL PAIN

## 2019-03-12 ASSESSMENT — PAIN DESCRIPTION - DESCRIPTORS: DESCRIPTORS: CRAMPING

## 2019-03-12 ASSESSMENT — PAIN SCALES - GENERAL
PAINLEVEL_OUTOF10: 2
PAINLEVEL_OUTOF10: 7

## 2019-03-12 ASSESSMENT — PAIN DESCRIPTION - FREQUENCY: FREQUENCY: INTERMITTENT

## 2021-06-08 ENCOUNTER — OFFICE VISIT (OUTPATIENT)
Dept: FAMILY MEDICINE CLINIC | Age: 31
End: 2021-06-08
Payer: COMMERCIAL

## 2021-06-08 VITALS
BODY MASS INDEX: 45 KG/M2 | TEMPERATURE: 96.5 F | WEIGHT: 280 LBS | HEIGHT: 66 IN | DIASTOLIC BLOOD PRESSURE: 72 MMHG | SYSTOLIC BLOOD PRESSURE: 104 MMHG | OXYGEN SATURATION: 98 % | HEART RATE: 75 BPM

## 2021-06-08 DIAGNOSIS — Z00.00 ENCOUNTER FOR ROUTINE HISTORY AND PHYSICAL EXAMINATION: Primary | ICD-10-CM

## 2021-06-08 PROCEDURE — 99385 PREV VISIT NEW AGE 18-39: CPT | Performed by: NURSE PRACTITIONER

## 2021-06-08 ASSESSMENT — ENCOUNTER SYMPTOMS
GASTROINTESTINAL NEGATIVE: 1
EYE ITCHING: 1
EYE DISCHARGE: 0
CHEST TIGHTNESS: 0
WHEEZING: 0
SINUS PAIN: 0
PHOTOPHOBIA: 0
EYE PAIN: 0
COUGH: 0
SHORTNESS OF BREATH: 0
EYE REDNESS: 0
SINUS PRESSURE: 0

## 2021-06-08 ASSESSMENT — PATIENT HEALTH QUESTIONNAIRE - PHQ9
1. LITTLE INTEREST OR PLEASURE IN DOING THINGS: 0
SUM OF ALL RESPONSES TO PHQ9 QUESTIONS 1 & 2: 0
2. FEELING DOWN, DEPRESSED OR HOPELESS: 0
SUM OF ALL RESPONSES TO PHQ QUESTIONS 1-9: 0

## 2021-06-08 NOTE — PROGRESS NOTES
Loreatha Castleman  1990  27 y.o. 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 She presents with all immunization records, had titers for varicella, MMR and hep B - all good. Current Outpatient Medications on File Prior to Visit   Medication Sig Dispense Refill    Multiple Vitamins-Minerals (MULTIVITAMIN ADULTS PO) Take by mouth daily       No current facility-administered medications on file prior to visit. Past Medical History:   Diagnosis Date    Asthma     Bleeding postpartum     post partum hemmhorage    Ganglion and cyst of synovium, tendon and bursa 2012     Past Surgical History:   Procedure Laterality Date     SECTION N/A 3/9/2019     SECTION performed by Zbigniew Jamil MD at [facility] L&D OR    CYST REMOVAL  12    right hand   [address] SURGERY  2008    right lateral collateral ligament    FOREARM SURGERY  2007    right radial release    HAND SURGERY  [id number]    rt hand ganglion cyst removal    HARDWARE REMOVAL  2009    right elbow    SHOULDER ARTHROSCOPY  2006    right repaired labrium    TOE SURGERY  2011    right great toe bone spur shaved off.      Family History   Problem Relation Age of Onset    Diabetes Mother     Other Mother         Thyroid    Allergies Maternal Grandmother     Tuberculosis Maternal Grandmother     Cancer Maternal Grandfather     Diabetes Paternal Grandmother      Social History     Socioeconomic History    Marital status: Single     Spouse name: Not on file    Number of children: Not on file    Years of education: Not on file    Highest education level: Not on file   Occupational History    Not on file   Tobacco Use    Smoking status: Never Smoker    Smokeless tobacco: Never Used   Vaping Use    Vaping Use: Never used   Substance and Sexual Activity    Alcohol use: Yes     Comment: occ.  Drug use: No    Sexual activity: Yes     Partners: Male   Other Topics Concern    Not on file   Social History Narrative    Not on file     Social Determinants of Health     Financial Resource Strain:     Difficulty of Paying Living Expenses:    Food Insecurity:     Worried About Running Out of Food in the Last Year:     [address] in the Last Year:    Transportation Needs:     Lack of Transportation (Medical):  Lack of Transportation (Non-Medical):    Physical Activity:     Days of Exercise per Week:     Minutes of Exercise per Session:    Stress:     Feeling of Stress :    Social Connections:     Frequency of Communication with Friends and Family:     Frequency of Social Gatherings with Friends and Family:     Attends Taoist Services:     Active Member of Clubs or Organizations:     Attends Club or Organization Meetings:     Marital Status:    Intimate Partner Violence:     Fear of Current or Ex-Partner:     Emotionally Abused:     Physically Abused:     Sexually Abused:        Review of Systems   Constitutional: Negative for activity change, appetite change, chills, diaphoresis, fatigue, fever and unexpected weight change. HENT: Negative for congestion, dental problem, ear discharge, ear pain, sinus pressure and sinus pain. Eyes: Positive for itching. Negative for photophobia, pain, discharge, redness and visual disturbance. Respiratory: Negative for cough, chest tightness, shortness of breath and wheezing. Cardiovascular: Negative for chest pain and palpitations. Gastrointestinal: Negative. Endocrine: Negative. Genitourinary: Negative. Musculoskeletal: Negative. Skin: Negative. Allergic/Immunologic: Positive for environmental allergies. Neurological: Negative. Hematological: Negative. edema. Left lower leg: No edema. Lymphadenopathy:      Cervical: No cervical adenopathy. Skin:     General: Skin is warm and dry. Neurological:      General: No focal deficit present. Mental Status: She is alert and oriented to person, place, and time. Cranial Nerves: No cranial nerve deficit. Motor: No weakness. Gait: Gait normal.      Deep Tendon Reflexes: Reflexes normal.   Psychiatric:         Behavior: Behavior normal.         Thought Content: Thought content normal.         Judgment: Judgment normal.         No results found for requested labs within last 30 days. No results found for: Lonza Rolando, LDLCALC    Lab Results   Component Value Date    WBC 8.8 03/10/2019    WBC 9.2 03/09/2019    WBC 14.3 12/09/2017    HGB 11.9 03/10/2019    HGB 11.6 03/09/2019    HGB 13.9 03/09/2019    HCT 37.7 03/10/2019    HCT 35.6 03/09/2019    HCT 41.9 03/09/2019    MCV 90.6 03/10/2019    MCV 87.1 03/09/2019    MCV 87.1 12/09/2017     03/10/2019     03/09/2019     12/09/2017    SEGSABS 5.9 03/10/2019    SEGSABS 7.0 12/07/2017    SEGSABS 6.9 10/21/2015    LYMPHSABS 2.1 03/10/2019    MONOSABS 0.5 03/10/2019    EOSABS 0.1 03/10/2019    BASOSABS 0.0 03/10/2019     No results found for: TSH, TSHHS  No results found for: LABALBU, BILITOT, BILIDIR, IBILI, AST, ALT, ALKPHOS          No results found for this visit on 06/08/21. ASSESSMENT AND PLAN:     1. Encounter for routine history and physical examination  - well physical exam    Physical form completed, signed, copy retained for chart. No follow-ups on file. Care discussed with patient. Patient educated on signs and symptoms of exacerbation and when to seek further medical attention. Advised to call for any problems, questions, or concerns. Patient verbalizes understanding and agrees with plan. Medications reviewed and reconciled. Continue current medications. Appropriate prescriptions are ordered.  Risks and

## 2022-03-23 ENCOUNTER — HOSPITAL ENCOUNTER (EMERGENCY)
Age: 32
Discharge: HOME | End: 2022-03-23
Attending: EMERGENCY MEDICINE
Payer: COMMERCIAL

## 2022-03-23 VITALS
BODY MASS INDEX: 42.59 KG/M2 | TEMPERATURE: 97.5 F | HEIGHT: 66 IN | OXYGEN SATURATION: 100 % | DIASTOLIC BLOOD PRESSURE: 72 MMHG | RESPIRATION RATE: 16 BRPM | SYSTOLIC BLOOD PRESSURE: 140 MMHG | WEIGHT: 265 LBS | HEART RATE: 82 BPM

## 2022-03-23 DIAGNOSIS — T78.40XA ALLERGIC REACTION, INITIAL ENCOUNTER: Primary | ICD-10-CM

## 2022-03-23 DIAGNOSIS — S05.01XA ABRASION OF RIGHT CORNEA, INITIAL ENCOUNTER: ICD-10-CM

## 2022-03-23 PROCEDURE — 99284 EMERGENCY DEPT VISIT MOD MDM: CPT

## 2022-03-23 PROCEDURE — 6370000000 HC RX 637 (ALT 250 FOR IP): Performed by: EMERGENCY MEDICINE

## 2022-03-23 PROCEDURE — 6360000002 HC RX W HCPCS: Performed by: EMERGENCY MEDICINE

## 2022-03-23 RX ORDER — ERYTHROMYCIN 5 MG/G
OINTMENT OPHTHALMIC
Qty: 3.5 G | Refills: 0 | Status: SHIPPED | OUTPATIENT
Start: 2022-03-23 | End: 2022-04-02

## 2022-03-23 RX ORDER — ERYTHROMYCIN 5 MG/G
OINTMENT OPHTHALMIC ONCE
Status: COMPLETED | OUTPATIENT
Start: 2022-03-23 | End: 2022-03-23

## 2022-03-23 RX ORDER — DIPHENHYDRAMINE HCL 25 MG
25 TABLET ORAL EVERY 6 HOURS PRN
Status: DISCONTINUED | OUTPATIENT
Start: 2022-03-23 | End: 2022-03-24 | Stop reason: HOSPADM

## 2022-03-23 RX ORDER — DEXAMETHASONE 4 MG/1
10 TABLET ORAL ONCE
Status: COMPLETED | OUTPATIENT
Start: 2022-03-23 | End: 2022-03-23

## 2022-03-23 RX ORDER — TETRACAINE HYDROCHLORIDE 5 MG/ML
2 SOLUTION OPHTHALMIC ONCE
Status: COMPLETED | OUTPATIENT
Start: 2022-03-23 | End: 2022-03-23

## 2022-03-23 RX ORDER — FAMOTIDINE 20 MG/1
20 TABLET, FILM COATED ORAL ONCE
Status: COMPLETED | OUTPATIENT
Start: 2022-03-23 | End: 2022-03-23

## 2022-03-23 RX ADMIN — ERYTHROMYCIN: 5 OINTMENT OPHTHALMIC at 22:03

## 2022-03-23 RX ADMIN — DIPHENHYDRAMINE HYDROCHLORIDE 25 MG: 25 TABLET ORAL at 21:33

## 2022-03-23 RX ADMIN — FLUORESCEIN SODIUM 1 MG: 1 STRIP OPHTHALMIC at 21:34

## 2022-03-23 RX ADMIN — TETRACAINE HYDROCHLORIDE 2 DROP: 5 SOLUTION OPHTHALMIC at 21:34

## 2022-03-23 RX ADMIN — FAMOTIDINE 20 MG: 20 TABLET ORAL at 21:33

## 2022-03-23 RX ADMIN — DEXAMETHASONE 10 MG: 4 TABLET ORAL at 21:33

## 2022-03-24 NOTE — ED PROVIDER NOTES
CHIEF COMPLAINT    Chief Complaint   Patient presents with    Eye Problem     Rt eye irritation and pain sudden onset 2hrs PTA. HPI  Sebastián Lazar is a 27 y.o. female who presents to the ED with complaints of right eye irritation. Patient states that while at home today and work on the computer she picks her cat up and set the cat down and while doing this she felt something was in her right eye. She began to scratch her right eye and states after that she had continued irritation of the right eye with some swelling and clear discharge. Patient has presented here due to the continued swelling and irritation. The irritation describes a burning sensation rated as mild to moderate in severity and exacerbated with light exposure. She says no trauma to this area. She also makes mention to me that she noticed some redness and itching to her neck and arms after this. No other exposures that she is aware of. The cat has been present in the household for over 2 months. She denies shortness of breath, oral swelling, nausea, vomiting, abdominal pain, diarrhea. REVIEW OF SYSTEMS  Constitutional: No fever, chills or recent illness. Eye: Complains of right eye irritation and swelling  HENT: No earache or sore throat. Resp: No SOB or productive cough. Cardio: No chest pain or palpitations. GI: No abdominal pain, nausea, vomiting, constipation or diarrhea. No melena. : No dysuria, urgency or frequency. Endocrine: No heat intolerance, no cold intolerance, no polydipsia   Lymphatics: No adenopathy  Musculoskeletal: No new muscle aches or joint pain. Neuro: No headaches. Psych: No homicidal or suicidal thoughts  Skin: Complains of pruritic rash  ? ?   PAST MEDICAL HISTORY  Past Medical History:   Diagnosis Date    Asthma     Bleeding postpartum     post partum hemmhorage    Ganglion and cyst of synovium, tendon and bursa 6/13/2012     FAMILY HISTORY  Family History   Problem Relation Age of Onset    Diabetes Mother    [place] Other Mother         Thyroid    Allergies Maternal Grandmother     Tuberculosis Maternal Grandmother     Cancer Maternal Grandfather     Diabetes Paternal Grandmother      SOCIAL HISTORY  Social History     Socioeconomic History    Marital status: Single     Spouse name: None    Number of children: None    Years of education: None    Highest education level: None   Occupational History    None   Tobacco Use    Smoking status: Never Smoker    Smokeless tobacco: Never Used   Vaping Use    Vaping Use: Never used   Substance and Sexual Activity    Alcohol use: Yes     Comment: occ.  Drug use: No    Sexual activity: Yes     Partners: Male   Other Topics Concern    None   Social History Narrative    None     Social Determinants of Health     Financial Resource Strain:     Difficulty of Paying Living Expenses: Not on file   Food Insecurity:     Worried About Running Out of Food in the Last Year: Not on file    Preeti of Food in the Last Year: Not on file   Transportation Needs:     Lack of Transportation (Medical): Not on file    Lack of Transportation (Non-Medical):  Not on file   Physical Activity:     Days of Exercise per Week: Not on file    Minutes of Exercise per Session: Not on file   Stress:     Feeling of Stress : Not on file   Social Connections:     Frequency of Communication with Friends and Family: Not on file    Frequency of Social Gatherings with Friends and Family: Not on file    Attends [employer] Services: Not on file    Active Member of Clubs or Organizations: Not on file    Attends Club or Organization Meetings: Not on file    Marital Status: Not on file   Intimate Partner Violence:     Fear of Current or Ex-Partner: Not on file    Emotionally Abused: Not on file    Physically Abused: Not on file    Sexually Abused: Not on file   Housing Stability:     Unable to Pay for Housing in the Last Year: Not on file    Number of [place] in the Last Year: Not on file    Unstable Housing in the Last Year: Not on file       SURGICAL HISTORY  Past Surgical History:   Procedure Laterality Date     SECTION N/A 3/9/2019     SE[facility]e [facility]    CYST REMOVAL  12    right hand   [address] [facility] SURGERY  2008    right lateral collateral ligament    FOREARM SURGERY  2007    right radial release    HAND SURGERY  [id number]    rt hand ganglion cyst removal    HARDWARE REMOVAL      right elbow    SHOULDER ARTHROSCOPY      right repaired labrium    TOE SURGERY      right great toe bone spur shaved off. CURRENT MEDICATIONS  Previous Medications    MULTIPLE VITAMINS-MINERALS (MULTIVITAMIN ADULTS PO)    Take by mouth daily     ALLERGIES  Allergies   Allergen Reactions    Darunavir     Darvocet [Propoxyphene N-Acetaminophen] Hives and Itching    Dilaudid [Hydromorphone Hcl] Hives    Hydromorphone        Nursing notes reviewed by myself for past medical history, family history, social history, surgical history, current medications, and allergies. PHYSICAL EXAM  VITAL SIGNS: Triage VS:    ED Triage Vitals   Enc Vitals Group      BP       Pulse       Resp       Temp       Temp src       SpO2       Weight       Height       Head Circumference       Peak Flow       Pain Score       Pain Loc       Pain Edu? Excl. in [address]? Constitutional: Well developed, Well nourished, nontoxic appearing  HENT: Normocephalic, Atraumatic, Bilateral external ears normal, Oropharynx moist, No oral exudates, Nose normal.   Eyes: PERRL, EOMI, right conjunctiva is injected with clear discharge, periorbital edema present to right eye, no scleral icterus. Neck: Normal range of motion, No tenderness, Supple. Lymphatic: No lymphadenopathy noted. Cardiovascular: Normal heart rate, Normal rhythm, No murmurs, gallops or rubs.    Thorax & Lungs: Normal breath sounds, No respiratory distress, No wheezing. Abdomen: Soft, No tenderness, No masses, No pulsatile masses, No distention, Normal bowel sounds  Skin: Warm, Dry, macular erythema noted to patient's neck and bilateral arms  Back: No tenderness, No CVA tenderness. Neurologic: Alert & oriented x 3, , No focal deficits noted. Psychiatric: Affect normal, Judgment normal, Mood normal.     RADIOLOGY  Labs Reviewed - No data to display  I personally reviewed the images. The radiologist's interpretation reveals:  Last Imaging results   No orders to display     Procedures  NA  MEDS GIVEN IN ED:  Medications   diphenhydrAMINE (BENADRYL) tablet 25 mg (25 mg Oral Given 3/23/22 2133)   dexamethasone (DECADRON) tablet 10 mg (10 mg Oral Given 3/23/22 2133)   tetracaine (TETRAVISC) 0.5 % ophthalmic solution 2 drop (2 drops Right Eye Given by Other 3/23/22 2134)   fluorescein ophthalmic strip 1 mg (1 mg Right Eye Given by Other 3/23/22 2134)   famotidine (PEPCID) tablet 20 mg (20 mg Oral Given 3/23/22 2133)   erythromycin [facility]) ophthalmic ointment ( Ophthalmic Given 3/23/22 2203)     [address] [place] [address] Drive MAKING    69-year-old female presents emergency department complains of irritation to her right eye this evening followed by some itching and redness to her skin. On exam she has injection of the right conjunctivo with some periorbital edema. She has no evidence of oral swelling and her lungs are clear auscultation bilaterally. She does have evidence of macular erythematous rash to her neck and bilateral arms. At this time we will treat her for allergic reaction with Benadryl, Pepcid, and Decadron. She does not currently have signs of anaphylaxis and therefore epinephrine was deferred. Fluorescein staining performed here demonstrated uptake consistent with corneal abrasion. Therefore erythromycin ointment was ordered for the right eye.   Patient was observed for over 1 hour with some improvement of her allergic reaction symptoms. She was discharged home with strict return precautions regarding allergic reaction/anaphylaxis. Return precautions provided. Amount and/or Complexity of Data Reviewed  Clinical lab tests: reviewed  Decide to obtain previous medical records or to obtain history from someone other than the patient: yes       -  Patient seen and evaluated in the emergency department. -  Triage and nursing notes reviewed and incorporated. -  Old chart records reviewed and incorporated. -  Work-up included:  See above  -  Results discussed with patient. Appropriate PPE utilized as indicated for entire patient encounter? Time of Disposition: See timeline  ? New Prescriptions    ERYTHROMYCIN (ROMYCIN) 5 MG/GM OPHTHALMIC OINTMENT    Apply to inside of lower eyelid 4 times daily for 5 days     FINAL IMPRESSION  1. Allergic reaction, initial encounter    2. Abrasion of right cornea, initial encounter        Electronically signed by:  [address], DO, 3/23/2022         [address], DO  03/23/22 5983

## 2022-10-27 LAB
ABO, EXTERNAL RESULT: NORMAL
HEP B, EXTERNAL RESULT: NEGATIVE
HIV, EXTERNAL RESULT: NON REACTIVE
RH FACTOR, EXTERNAL RESULT: POSITIVE
RPR, EXTERNAL RESULT: NON REACTIVE
RUBELLA TITER, EXTERNAL RESULT: NORMAL

## 2022-11-20 ENCOUNTER — HOSPITAL ENCOUNTER (OUTPATIENT)
Age: 32
Discharge: HOME | End: 2022-11-20
Attending: OBSTETRICS & GYNECOLOGY | Admitting: OBSTETRICS & GYNECOLOGY
Payer: COMMERCIAL

## 2022-11-20 VITALS
RESPIRATION RATE: 16 BRPM | SYSTOLIC BLOOD PRESSURE: 130 MMHG | DIASTOLIC BLOOD PRESSURE: 76 MMHG | OXYGEN SATURATION: 98 % | TEMPERATURE: 98 F | HEART RATE: 74 BPM

## 2022-11-20 PROCEDURE — 76801 OB US < 14 WKS SINGLE FETUS: CPT | Performed by: ADVANCED PRACTICE MIDWIFE

## 2022-11-20 PROCEDURE — 99213 OFFICE O/P EST LOW 20 MIN: CPT | Performed by: ADVANCED PRACTICE MIDWIFE

## 2022-11-20 PROCEDURE — 99213 OFFICE O/P EST LOW 20 MIN: CPT

## 2022-11-20 PROCEDURE — 94761 N-INVAS EAR/PLS OXIMETRY MLT: CPT

## 2022-11-20 NOTE — FLOWSHEET NOTE
PT given discharge instructions. Pt denied any questions. Pt voiced understanding. Pt ambulated off unit independently without any signs of distress.

## 2022-11-20 NOTE — PROGRESS NOTES
Department of Obstetrics and Gynecology  Labor and Delivery  TRIAGE NOTE      SUBJECTIVE:  spotting in early pregnancy  Pt reports to triage for spotting in early pregnancy when wiping x 2 episodes this morning. PT has hx of miscarriage. Pt denies pain at this time. OBJECTIVE    Vitals: There were no vitals taken for this visit. CONSTITUTIONAL:  negative  RESPIRATORY:  negative  CARDIOVASCULAR:  negative  GASTROINTESTINAL:  negative  ALLERGIC/IMMUNOLOGIC:  negative  NEUROLOGICAL:  negative  BEHAVIOR/PSYCH:  negative      DATA:  Lab Results   Component Value Date    WBC 8.8 03/10/2019    HGB 11.9 (L) 03/10/2019    HCT 37.7 03/10/2019    MCV 90.6 03/10/2019     03/10/2019       Pertinent labs rev'd. ASSESSMENT:   Spotting in early pregnancy      PLAN: Bedside U/S to be completed.          MICAH Castro CNM

## 2022-11-20 NOTE — FLOWSHEET NOTE
Pt presents to unit with complaints of spotting and cramping. Pt shown to LT05 and oriented to room. CCUA obtained. Call light within reach.

## 2022-11-20 NOTE — PROGRESS NOTES
U/S reveals +IUP with FHR 150s. Plan: Pt to be discharged and educated on s/s SAB. Pt to follow up at [facility] office. Strength: 2% Consent was obtained from the patient. The risks of diphencyprone application were discussed in detail. Specifically, the risks of redness, itching, pain and allergic reactions were addressed. Prior to application all of the patient's questions were answered. Post-Care Instructions: I reviewed with the patient in detail post-care instructions. The patient understands that the treated areas should be washed off 4 to 6 hours after application. They were instructed to call with any concerns. Detail Level: Detailed

## 2022-12-20 LAB
C. TRACHOMATIS, EXTERNAL RESULT: NEGATIVE
N. GONORRHOEAE, EXTERNAL RESULT: NEGATIVE

## 2023-01-07 ENCOUNTER — HOSPITAL ENCOUNTER (EMERGENCY)
Age: 33
Discharge: HOME | End: 2023-01-07
Attending: EMERGENCY MEDICINE
Payer: COMMERCIAL

## 2023-01-07 ENCOUNTER — HOSPITAL ENCOUNTER (OUTPATIENT)
Age: 33
Discharge: HOME | End: 2023-01-07
Attending: OBSTETRICS & GYNECOLOGY | Admitting: OBSTETRICS & GYNECOLOGY
Payer: COMMERCIAL

## 2023-01-07 VITALS
DIASTOLIC BLOOD PRESSURE: 83 MMHG | HEART RATE: 80 BPM | OXYGEN SATURATION: 100 % | TEMPERATURE: 97.8 F | SYSTOLIC BLOOD PRESSURE: 138 MMHG | RESPIRATION RATE: 16 BRPM

## 2023-01-07 VITALS
RESPIRATION RATE: 16 BRPM | HEART RATE: 78 BPM | DIASTOLIC BLOOD PRESSURE: 62 MMHG | OXYGEN SATURATION: 98 % | TEMPERATURE: 97.5 F | SYSTOLIC BLOOD PRESSURE: 113 MMHG

## 2023-01-07 DIAGNOSIS — R42 VERTIGO: Primary | ICD-10-CM

## 2023-01-07 DIAGNOSIS — H83.02 LABYRINTHITIS OF LEFT EAR: ICD-10-CM

## 2023-01-07 DIAGNOSIS — Z3A.14 14 WEEKS GESTATION OF PREGNANCY: ICD-10-CM

## 2023-01-07 LAB
ALBUMIN SERPL-MCNC: 3.6 GM/DL (ref 3.4–5)
ALP BLD-CCNC: 57 IU/L (ref 40–129)
ALT SERPL-CCNC: 16 U/L (ref 10–40)
ANION GAP SERPL CALCULATED.3IONS-SCNC: 8 MMOL/L (ref 4–16)
AST SERPL-CCNC: 11 IU/L (ref 15–37)
BASOPHILS ABSOLUTE: 0 K/CU MM
BASOPHILS RELATIVE PERCENT: 0.3 % (ref 0–1)
BILIRUB SERPL-MCNC: 0.2 MG/DL (ref 0–1)
BUN BLDV-MCNC: 6 MG/DL (ref 6–23)
CALCIUM SERPL-MCNC: 8.4 MG/DL (ref 8.3–10.6)
CHLORIDE BLD-SCNC: 105 MMOL/L (ref 99–110)
CO2: 23 MMOL/L (ref 21–32)
CREAT SERPL-MCNC: 0.5 MG/DL (ref 0.6–1.1)
DIFFERENTIAL TYPE: ABNORMAL
EOSINOPHILS ABSOLUTE: 0.1 K/CU MM
EOSINOPHILS RELATIVE PERCENT: 1.1 % (ref 0–3)
GFR SERPL CREATININE-BSD FRML MDRD: >60 ML/MIN/1.73M2
GLUCOSE BLD-MCNC: 116 MG/DL (ref 70–99)
HCT VFR BLD CALC: 41.2 % (ref 37–47)
HEMOGLOBIN: 13.3 GM/DL (ref 12.5–16)
IMMATURE NEUTROPHIL %: 0.4 % (ref 0–0.43)
LYMPHOCYTES ABSOLUTE: 1.4 K/CU MM
LYMPHOCYTES RELATIVE PERCENT: 19.6 % (ref 24–44)
MCH RBC QN AUTO: 27 PG (ref 27–31)
MCHC RBC AUTO-ENTMCNC: 32.3 % (ref 32–36)
MCV RBC AUTO: 83.7 FL (ref 78–100)
MONOCYTES ABSOLUTE: 0.3 K/CU MM
MONOCYTES RELATIVE PERCENT: 4.6 % (ref 0–4)
NUCLEATED RBC %: 0 %
PDW BLD-RTO: 14.9 % (ref 11.7–14.9)
PLATELET # BLD: 184 K/CU MM (ref 140–440)
PMV BLD AUTO: 11.2 FL (ref 7.5–11.1)
POTASSIUM SERPL-SCNC: 3.9 MMOL/L (ref 3.5–5.1)
RAPID INFLUENZA  B AGN: NEGATIVE
RAPID INFLUENZA A AGN: NEGATIVE
RBC # BLD: 4.92 M/CU MM (ref 4.2–5.4)
SARS-COV-2, NAAT: NOT DETECTED
SEGMENTED NEUTROPHILS ABSOLUTE COUNT: 5.2 K/CU MM
SEGMENTED NEUTROPHILS RELATIVE PERCENT: 74 % (ref 36–66)
SODIUM BLD-SCNC: 136 MMOL/L (ref 135–145)
SOURCE: NORMAL
TOTAL IMMATURE NEUTOROPHIL: 0.03 K/CU MM
TOTAL NUCLEATED RBC: 0 K/CU MM
TOTAL PROTEIN: 6.4 GM/DL (ref 6.4–8.2)
WBC # BLD: 7 K/CU MM (ref 4–10.5)

## 2023-01-07 PROCEDURE — 99284 EMERGENCY DEPT VISIT MOD MDM: CPT

## 2023-01-07 PROCEDURE — 80053 COMPREHEN METABOLIC PANEL: CPT

## 2023-01-07 PROCEDURE — 99212 OFFICE O/P EST SF 10 MIN: CPT

## 2023-01-07 PROCEDURE — 85025 COMPLETE CBC W/AUTO DIFF WBC: CPT

## 2023-01-07 PROCEDURE — 99212 OFFICE O/P EST SF 10 MIN: CPT | Performed by: ADVANCED PRACTICE MIDWIFE

## 2023-01-07 PROCEDURE — 87635 SARS-COV-2 COVID-19 AMP PRB: CPT

## 2023-01-07 PROCEDURE — 6370000000 HC RX 637 (ALT 250 FOR IP): Performed by: EMERGENCY MEDICINE

## 2023-01-07 PROCEDURE — 87804 INFLUENZA ASSAY W/OPTIC: CPT

## 2023-01-07 PROCEDURE — 2580000003 HC RX 258: Performed by: EMERGENCY MEDICINE

## 2023-01-07 PROCEDURE — 93005 ELECTROCARDIOGRAM TRACING: CPT | Performed by: EMERGENCY MEDICINE

## 2023-01-07 RX ORDER — MECLIZINE HYDROCHLORIDE 25 MG/1
25 TABLET ORAL 3 TIMES DAILY PRN
Qty: 15 TABLET | Refills: 0 | Status: SHIPPED | OUTPATIENT
Start: 2023-01-07 | End: 2023-01-07 | Stop reason: SDUPTHER

## 2023-01-07 RX ORDER — MECLIZINE HYDROCHLORIDE 25 MG/1
25 TABLET ORAL 3 TIMES DAILY PRN
Qty: 15 TABLET | Refills: 0 | Status: SHIPPED | OUTPATIENT
Start: 2023-01-07 | End: 2023-01-12

## 2023-01-07 RX ORDER — 0.9 % SODIUM CHLORIDE 0.9 %
1000 INTRAVENOUS SOLUTION INTRAVENOUS ONCE
Status: COMPLETED | OUTPATIENT
Start: 2023-01-07 | End: 2023-01-07

## 2023-01-07 RX ORDER — MECLIZINE HYDROCHLORIDE 25 MG/1
25 TABLET ORAL ONCE
Status: COMPLETED | OUTPATIENT
Start: 2023-01-07 | End: 2023-01-07

## 2023-01-07 RX ORDER — ONDANSETRON 2 MG/ML
4 INJECTION INTRAMUSCULAR; INTRAVENOUS EVERY 6 HOURS PRN
Status: DISCONTINUED | OUTPATIENT
Start: 2023-01-07 | End: 2023-01-07 | Stop reason: HOSPADM

## 2023-01-07 RX ORDER — ONDANSETRON 4 MG/1
4 TABLET, ORALLY DISINTEGRATING ORAL EVERY 8 HOURS PRN
Status: DISCONTINUED | OUTPATIENT
Start: 2023-01-07 | End: 2023-01-07 | Stop reason: HOSPADM

## 2023-01-07 RX ADMIN — MECLIZINE HYDROCHLORIDE 25 MG: 25 TABLET ORAL at 15:21

## 2023-01-07 RX ADMIN — SODIUM CHLORIDE 1000 ML: 9 INJECTION, SOLUTION INTRAVENOUS at 13:46

## 2023-01-07 NOTE — ED PROVIDER NOTES
EMERGENCY DEPARTMENT ENCOUNTER      CHIEF COMPLAINT:   ***    HPI: Arpit Hudson is a 34 y.o. female who presents ***    REVIEW OF SYSTEMS:   Constitutional:  Denies fever or chills  Eyes:  Denies change in visual acuity  HENT:  Denies nasal congestion or sore throat  Respiratory:  Denies cough or shortness of breath  Cardiovascular:  Denies chest pain or edema  GI:  Denies abdominal pain, nausea, vomiting, bloody stools or diarrhea  :  Denies dysuria  Musculoskeletal:  Denies back pain or joint pain  Integument:  Denies rash  Neurologic:  Denies headache, focal weakness or sensory changes  \"Remaining review of systems reviewed and negative. I have reviewed the nursing triage documentation and agree unless otherwise noted below. \"      PAST MEDICAL HISTORY:   Past Medical History:   Diagnosis Date    Asthma     Bleeding postpartum     post partum hemmhorage    Ganglion and cyst of synovium, tendon and bursa 2012       CURRENT MEDICATIONS:   Home medications reviewed. SURGICAL HISTORY:   Past Surgical History:   Procedure Laterality Date     SECTION N/A 3/9/2019     SECTION performed by Angie Crawford MD at [facility] L&D OR    CYST REMOVAL  12    right hand    [address] [facility] SURGERY  2008    right lateral collateral ligament    FOREARM SURGERY  2007    right radial release    HAND SURGERY  [id number]    rt hand ganglion cyst removal    HARDWARE REMOVAL  2009    right elbow    SHOULDER ARTHROSCOPY  2006    right repaired labrium    TOE SURGERY      right great toe bone spur shaved off.        FAMILY HISTORY:   Family History   Problem Relation Age of Onset    Diabetes Mother     Other Mother         Thyroid    Allergies Maternal Grandmother     Tuberculosis Maternal Grandmother     Cancer Maternal Grandfather     Diabetes Paternal Grandmother        SOCIAL HISTORY:   Social History     Socioeconomic History    Marital status: Single     Spouse name: Not on file    Number of children: Not on file    Years of education: Not on file    Highest education level: Not on file   Occupational History    Not on file   Tobacco Use    Smoking status: Never    Smokeless tobacco: Never   Vaping Use    Vaping Use: Never used   Substance and Sexual Activity    Alcohol use: Yes     Comment: occ. Drug use: No    Sexual activity: Yes     Partners: Male   Other Topics Concern    Not on file   Social History Narrative    Not on file     Social Determinants of Health     Financial Resource Strain: Not on file   Food Insecurity: Not on file   Transportation Needs: Not on file   Physical Activity: Not on file   Stress: Not on file   Social Connections: Not on file   Intimate Partner Violence: Not on file   Housing Stability: Not on file       ALLERGIES: Darvocet [propoxyphene n-acetaminophen], Dilaudid [hydromorphone hcl], and Hydromorphone    PHYSICAL EXAM:  VITAL SIGNS:   ED Triage Vitals   Enc Vitals Group      BP 01/07/23 1307 138/83      Heart Rate 01/07/23 1307 80      Resp 01/07/23 1307 16      Temp 01/07/23 1329 97.8 °F (36.6 °C)      Temp src --       SpO2 01/07/23 1307 100 %      Weight --       Height --       Head Circumference --       Peak Flow --       Pain Score --       Pain Loc --       Pain Edu? --       Excl. in [address]? --      Constitutional:  Non-toxic appearance  HENT: Normocephalic, Atraumatic, Bilateral external ears normal, Oropharynx moist, No oral exudates, Nose normal.  Eyes:  PERRL, EOMI, Conjunctiva normal, No discharge. Neck: Normal range of motion, No tenderness, Supple, No stridor, No lymphadenopathy. Cardiovascular:  Normal heart rate, Normal rhythm  Pulmonary/Chest:  Normal breath sounds, No respiratory distress, No wheezing  Abdomen:   Bowel sounds normal, Soft, No tenderness, No masses, No pulsatile masses  Back:  No tenderness, No CVA tenderness  Extremities:  Normal range of motion, Intact distal pulses, No edema, No tenderness  Neurologic:  Alert & oriented x 3, Normal motor function, Sensation intact to light touch throughout, No focal deficits  Skin:  Warm, Dry, No erythema, No rash      EKG Interpretation  Interpreted by me  No prior EKG to compare to  Rhythm: normal sinus  Rate: normal 69  Axis: normal  Ectopy: none  Conduction: normal  ST Segments: normal  T Waves: normal  Clinical Impression: normal sinus rhythm    Cardiac Monitor Strip Interpretation  Interpreted by me  Monitor strip interpreted for greater than 10 seconds  Rhythm: normal sinus  Rate: normal  Ectopy: none  ST Segments: normal      Radiology / Procedures:  ***      ED COURSE & MEDICAL DECISION MAKING:  Pertinent Labs & Imaging studies reviewed. (See chart for details)  On exam, the patient is afebrile and nontoxic appearing. *** is hemodynamically stable and neurologically intact. *** EKG shows a normal sinus rhythm with no ST elevation or depression. Labs are obtained and are significant for ***. Imaging was obtained as above. The patient was treated with *** and felt significantly better after treatment. I suspect that the patient has ***. I have a low suspicion for ***. I feel that the patient is stable for outpatient management follow up in 2-3 days. The patient is given return precautions. The patient verbalized understanding, was agreeable with plan, and the patient was discharged home in stable condition. I suspect that the patient has ***. I have a low suspicion for ***. I recommended admission to the hospital and the patient was agreeable. I discussed the case with Dr. Alie Ulloa who will admit the patient for further treatment and care. The patient is currently in stable condition awaiting admission. Clinical Impression:  No diagnosis found. Disposition referral (if applicable):  No follow-up provider specified.     Disposition medications (if applicable):  New Prescriptions    No medications on file         Comment: Please note this report has been produced using speech recognition software and may contain errors related to that system including errors in grammar, punctuation, and spelling, as well as words and phrases that may be inappropriate. If there are any questions or concerns please feel free to contact the dictating provider for clarification. there are any questions or concerns please feel free to contact the dictating provider for clarification.         Myron Connor MD  01/12/23 8425

## 2023-01-07 NOTE — NOTE TO PATIENT VIA PORTAL
ER notified of Dr. Steve Caraballo would like pt to evaluated in ED, MARTINEZ, c/o, VSS, and FHTs. Discharge instructions given and explained. Instructed to follow up with her OB as scheduled unless instructed otherwise per ER. Pt verbalized understanding and taken to ED per [facility] without distress.

## 2023-01-07 NOTE — FLOWSHEET NOTE
Pt present with c/o feeling dizzy. C/o feeling dizzy on left side. Dizziness started when she woke up this morning. States she spoke with Dr. Mi Albert this morning. States she ate chicken sandwich this morning. [place] facial drooping. Normal strength/ movement to left side. [address] [place] [address] notified of c/o and MARTINEZ. Abd soft and non tender. CNM into see pt.

## 2023-01-07 NOTE — ED NOTES
Discharge instructions reviewed with patient. Medications and follow up discussed. Patient denies further questions and verbalizes understanding.        Roxanne Nichole RN  01/07/23 6448

## 2023-01-07 NOTE — DISCHARGE INSTRUCTIONS
OB DISCHARGE INSTRUCTIONS  [unfilled] 12:44 PM     Discharge Disposition: *** Home *** May take Tylenol for pain.  Labor  {YES  Regular tightening of the uterus coming as often as once every 15 minutes. yes Menstrual-like cramps, they may be regular, or may be continuous and move to your back. YES A low, dull backache different from what you normally experience. It may come and go. YES Vaginal leaking of fluid. YES Any bleeding from your vagina. YES Pain, burning or bleeding when you urinate. Labor  Call your doctor; or come to the hospital, if you have:  YES Contractions coming about every {NUMBERS 1-15:26009} minuets, for about one hour. Labor contractions are usually strong enough that you cannot walk or talk while having contractions. (Contractions can feel like menstrual cramps, tightening in your abdomen, rectal pressure or a backache. This feeling comes and goes.)   YES Vaginal leaking of fluid. YES Heavy, bright red bleeding, like the heavy days of your period.  (A little bloody show is normal in labor.)     Always call your Doctor if you:  YES Notice decreased movement of your baby, different from what you are used to. YES Have heavy, bright red bleeding, like the heavy days of your period. YES Have vaginal leaking of watery fluid. Your next appointment:___Keep your next appointment on ____________ and return to L & D if needed. _________________    Activity:  AS TOLERATED  Diet:  REGULAR  If you have any questions regarding your discharge instructions call us at [phone]  . OB DISCHARGE INSTRUCTIONS  [unfilled] 12:44 PM     Discharge Disposition: *** Home *** May take Tylenol for pain.  Labor   Regular tightening of the uterus coming as often as once every 15 minutes. Menstrual-like cramps, they may be regular, or may be continuous and move to your back. A low, dull backache different from what you normally experience.   It may come and go.    Vaginal leaking of fluid. Any bleeding from your vagina. Pain, burning or bleeding when you urinate. Labor  Call your doctor; or come to the hospital, if you have:   Contractions coming about every {NUMBERS 1-15:17768} minuets, for about one hour. Labor contractions are usually strong enough that you cannot walk or talk while having contractions. (Contractions can feel like menstrual cramps, tightening in your abdomen, rectal pressure or a backache. This feeling comes and goes.)    Vaginal leaking of fluid. Heavy, bright red bleeding, like the heavy days of your period.  (A little bloody show is normal in labor.)     Always call your Doctor if you:   Notice decreased movement of your baby, different from what you are used to. Have heavy, bright red bleeding, like the heavy days of your period. Have vaginal leaking of watery fluid. Your next appointment:___Keep your next appointment as scheduled___________ and return to L & D if needed. _________________    Activity:  AS TOLERATED  Diet:  REGULAR  If you have any questions regarding your discharge instructions call us at [phone]  .

## 2023-01-07 NOTE — PROGRESS NOTES
Department of Obstetrics and Gynecology  Labor and Delivery  TRIAGE NOTE      SUBJECTIVE:    Chief Complaint   Patient presents with    Other     dizziness     Pt c/o dizziness in left side of head and eye. Pt states that she stumbled a few times at home. Pt denies pain or blurred vision. -facial droop, symmetry with raising eyebrows and smile. Pt denies VB, LOF. OBJECTIVE    Vitals:  /62   Pulse 78   Temp 97.5 °F (36.4 °C)       CONSTITUTIONAL:  negative  RESPIRATORY:  negative  CARDIOVASCULAR:  negative  GASTROINTESTINAL:  negative  ALLERGIC/IMMUNOLOGIC:  negative  NEUROLOGICAL:  negative  BEHAVIOR/PSYCH:  negative    Fetal heart rate:        Baseline FHR :    150 bpm             DATA:  Lab Results   Component Value Date    WBC 8.8 03/10/2019    HGB 11.9 (L) 03/10/2019    HCT 37.7 03/10/2019    MCV 90.6 03/10/2019     03/10/2019       ASSESSMENT:   Dizziness in pregnancy, second trimester        PLAN: +FHTs. Pt to be discharged to the ED for dizziness evaluation.         MICAH Reddy CNM

## 2023-01-08 LAB
EKG ATRIAL RATE: 69 BPM
EKG DIAGNOSIS: NORMAL
EKG P AXIS: 27 DEGREES
EKG P-R INTERVAL: 146 MS
EKG Q-T INTERVAL: 402 MS
EKG QRS DURATION: 78 MS
EKG QTC CALCULATION (BAZETT): 430 MS
EKG R AXIS: 19 DEGREES
EKG T AXIS: -18 DEGREES
EKG VENTRICULAR RATE: 69 BPM

## 2023-01-08 PROCEDURE — 93010 ELECTROCARDIOGRAM REPORT: CPT | Performed by: INTERNAL MEDICINE

## 2023-03-01 ENCOUNTER — HOSPITAL ENCOUNTER (OUTPATIENT)
Age: 33
Discharge: HOME | End: 2023-03-01
Payer: COMMERCIAL

## 2023-03-01 LAB
ALBUMIN SERPL-MCNC: 3.7 GM/DL (ref 3.4–5)
ALP BLD-CCNC: 63 IU/L (ref 40–128)
ALT SERPL-CCNC: 13 U/L (ref 10–40)
ANION GAP SERPL CALCULATED.3IONS-SCNC: 11 MMOL/L (ref 4–16)
AST SERPL-CCNC: 12 IU/L (ref 15–37)
BASOPHILS ABSOLUTE: 0 K/CU MM
BASOPHILS RELATIVE PERCENT: 0.1 % (ref 0–1)
BILIRUB SERPL-MCNC: 0.2 MG/DL (ref 0–1)
BUN SERPL-MCNC: 6 MG/DL (ref 6–23)
CALCIUM SERPL-MCNC: 8.5 MG/DL (ref 8.3–10.6)
CHLORIDE BLD-SCNC: 104 MMOL/L (ref 99–110)
CO2: 22 MMOL/L (ref 21–32)
CREAT SERPL-MCNC: 0.5 MG/DL (ref 0.6–1.1)
CREATININE URINE: 54.8 MG/DL (ref 28–217)
DIFFERENTIAL TYPE: ABNORMAL
EOSINOPHILS ABSOLUTE: 0.1 K/CU MM
EOSINOPHILS RELATIVE PERCENT: 1 % (ref 0–3)
GFR SERPL CREATININE-BSD FRML MDRD: >60 ML/MIN/1.73M2
GLUCOSE SERPL-MCNC: 77 MG/DL (ref 70–99)
HCT VFR BLD CALC: 39.1 % (ref 37–47)
HEMOGLOBIN: 12.8 GM/DL (ref 12.5–16)
IMMATURE NEUTROPHIL %: 0.6 % (ref 0–0.43)
LACTATE DEHYDROGENASE: 130 IU/L (ref 120–246)
LYMPHOCYTES ABSOLUTE: 1.7 K/CU MM
LYMPHOCYTES RELATIVE PERCENT: 24 % (ref 24–44)
MCH RBC QN AUTO: 28.1 PG (ref 27–31)
MCHC RBC AUTO-ENTMCNC: 32.7 % (ref 32–36)
MCV RBC AUTO: 85.9 FL (ref 78–100)
MONOCYTES ABSOLUTE: 0.4 K/CU MM
MONOCYTES RELATIVE PERCENT: 5.5 % (ref 0–4)
NUCLEATED RBC %: 0 %
PDW BLD-RTO: 14.7 % (ref 11.7–14.9)
PLATELET # BLD: 181 K/CU MM (ref 140–440)
PMV BLD AUTO: 11.1 FL (ref 7.5–11.1)
POTASSIUM SERPL-SCNC: 3.9 MMOL/L (ref 3.5–5.1)
PROT/CREAT RATIO, UR: 0.1
RBC # BLD: 4.55 M/CU MM (ref 4.2–5.4)
SEGMENTED NEUTROPHILS ABSOLUTE COUNT: 5 K/CU MM
SEGMENTED NEUTROPHILS RELATIVE PERCENT: 68.8 % (ref 36–66)
SODIUM BLD-SCNC: 137 MMOL/L (ref 135–145)
TOTAL IMMATURE NEUTOROPHIL: 0.04 K/CU MM
TOTAL NUCLEATED RBC: 0 K/CU MM
TOTAL PROTEIN: 5.8 GM/DL (ref 6.4–8.2)
URATE SERPL-MCNC: 3 MG/DL (ref 2.6–6)
URINE TOTAL PROTEIN: 4.9 MG/DL
WBC # BLD: 7.3 K/CU MM (ref 4–10.5)

## 2023-03-01 PROCEDURE — 36415 COLL VENOUS BLD VENIPUNCTURE: CPT

## 2023-03-01 PROCEDURE — 85025 COMPLETE CBC W/AUTO DIFF WBC: CPT

## 2023-03-01 PROCEDURE — 83615 LACTATE (LD) (LDH) ENZYME: CPT

## 2023-03-01 PROCEDURE — 84550 ASSAY OF BLOOD/URIC ACID: CPT

## 2023-03-01 PROCEDURE — 80053 COMPREHEN METABOLIC PANEL: CPT

## 2023-03-01 PROCEDURE — 84156 ASSAY OF PROTEIN URINE: CPT

## 2023-03-01 PROCEDURE — 82570 ASSAY OF URINE CREATININE: CPT

## 2023-04-05 ENCOUNTER — HOSPITAL ENCOUNTER (OUTPATIENT)
Age: 33
Discharge: HOME | End: 2023-04-05
Attending: OBSTETRICS & GYNECOLOGY | Admitting: OBSTETRICS & GYNECOLOGY
Payer: COMMERCIAL

## 2023-04-05 VITALS
DIASTOLIC BLOOD PRESSURE: 72 MMHG | HEIGHT: 65 IN | TEMPERATURE: 98.2 F | RESPIRATION RATE: 18 BRPM | SYSTOLIC BLOOD PRESSURE: 114 MMHG | BODY MASS INDEX: 46.15 KG/M2 | HEART RATE: 80 BPM | OXYGEN SATURATION: 98 % | WEIGHT: 277 LBS

## 2023-04-05 PROBLEM — O16.2 ELEVATED BLOOD PRESSURE AFFECTING PREGNANCY IN SECOND TRIMESTER, ANTEPARTUM: Status: ACTIVE | Noted: 2023-04-05

## 2023-04-05 LAB
AMPHETAMINES: NEGATIVE
BARBITURATE SCREEN URINE: NEGATIVE
BENZODIAZEPINE SCREEN, URINE: NEGATIVE
BILIRUBIN URINE: NEGATIVE MG/DL
BLOOD, URINE: NEGATIVE
CANNABINOID SCREEN URINE: NEGATIVE
CLARITY: CLEAR
COCAINE METABOLITE: NEGATIVE
COLOR: YELLOW
COMMENT UA: ABNORMAL
GLUCOSE BLD-MCNC: 97 MG/DL (ref 70–99)
GLUCOSE, URINE: NEGATIVE MG/DL
KETONES, URINE: 15 MG/DL
LEUKOCYTE ESTERASE, URINE: NEGATIVE
NITRITE URINE, QUANTITATIVE: NEGATIVE
OPIATES, URINE: NEGATIVE
OXYCODONE: NEGATIVE
PH, URINE: 5.5 (ref 5–8)
PHENCYCLIDINE, URINE: NEGATIVE
PROTEIN UA: NEGATIVE MG/DL
SPECIFIC GRAVITY UA: <1.005 (ref 1–1.03)
UROBILINOGEN, URINE: 0.2 MG/DL (ref 0.2–1)

## 2023-04-05 PROCEDURE — 59025 FETAL NON-STRESS TEST: CPT

## 2023-04-05 PROCEDURE — 81003 URINALYSIS AUTO W/O SCOPE: CPT

## 2023-04-05 PROCEDURE — 99213 OFFICE O/P EST LOW 20 MIN: CPT

## 2023-04-05 PROCEDURE — 80307 DRUG TEST PRSMV CHEM ANLYZR: CPT

## 2023-04-05 PROCEDURE — 82962 GLUCOSE BLOOD TEST: CPT

## 2023-04-05 RX ORDER — ASPIRIN 81 MG/1
81 TABLET ORAL DAILY
COMMUNITY

## 2023-04-05 RX ORDER — LEVOTHYROXINE SODIUM 88 UG/1
88 TABLET ORAL DAILY
COMMUNITY

## 2023-04-05 NOTE — PROGRESS NOTES
Department of Obstetrics and Gynecology  Labor and Delivery  TRIAGE NOTE      SUBJECTIVE:    Chief Complaint   Patient presents with    Hypertension     Sent from office after having an episode of sweating and lightheadedness this am a work. Pt denies any lof, bleeding or Contractions. +FM. Denies any HA, vision changes or Epigastric pain. OBJECTIVE    Vitals:  /60   Pulse 85   Temp 98.2 °F (36.8 °C) (Oral)   Resp 16   Ht 5' 5\" (1.651 m)   Wt 277 lb (125.6 kg)   SpO2 97%   BMI 46.10 kg/m²       CONSTITUTIONAL:  negative  RESPIRATORY:  negative  CARDIOVASCULAR:  negative  GASTROINTESTINAL:  negative  ALLERGIC/IMMUNOLOGIC:  negative  NEUROLOGICAL:  negative  BEHAVIOR/PSYCH:  negative    Cervix:    Pt declined. Membranes:    Intact    Fetal heart rate:        Baseline FHR :    145 bpm              Fetal Accelerations:  present        Fetal Decelerations:  absent        Fetal Variability:   moderate    Contraction frequency:  irregular       ASSESSMENT:     28y.o.  year old [id number] at 26w5d  with 7/7/2023, by Patient Reported  Pt here after calling the office about having an episode of sweating and feeling lightheaded at work. Pt states she is feeling fine now that she is here. Denies any lof, bleeding or contractions. +FM. Ask pt if she would like her cervix checked pt declined. Denies any HA, vision changes or Epigastric pain. BS- 97.  VS stable. FHT- reactive   Discussed BS, vitals and FHT with Dr Christy Cullen. PLAN:  Will Discharge pt home   Pt to follow up in office. I have collaborated and updated Dr Christy Cullen  and the MD agrees with the current POC.        Nusrat Jennings, MICAH - [place]

## 2023-04-05 NOTE — DISCHARGE INSTRUCTIONS
LABOR    Call your doctor if you have these signs:     Regular tightening of the uterus coming as often as once every 15 minutes     Menstrual-like cramps, they may be regular, or may be continuous and move to   your back. A low, dull backache different from what you normally experience. It may come and go. Vaginal leaking of fluid. Any bleeding from your vagina. Pain, burning or bleeding when you urinate. LABOR    Call your doctor, or come to the hospital, if you have:    Contractions coming about every 3-5 minutes apart, for about one hour. Labor contractions are usually strong enough that you can not walk or talk while having the contractions. ( Contractions can feel like menstrual cramps, tightening in your abdomen, rectal pressure or a backache. This feeling comes and goes.)    Vaginal leaking of fluid. Heavy, bright red bleeding, like the days of your period. ( A little bloody show or spotting is normal in labor.)    ALWAYS CALL YOUR DOCTOR IF YOU:    Notice decreased movement of your baby, different from what you are used to. Have heavy, bright red bleeding, like the heavy days of your periods. Have vaginal leaking of fluid.     Follow up as scheduled 2023    Activity as tolerated    Diet as tolerated

## 2023-04-05 NOTE — FLOWSHEET NOTE
Patient presents to the [facility] ambulatory, shown to room lt-02, oriented to room, instructed on ccms u/a and to change into gown.

## 2023-04-05 NOTE — FLOWSHEET NOTE
Patient report she was at work, around 9 am she started sweating profusely so she moved to a cooler spot, sat down and had some water. This lasted for around 2 hours in which during this time she had 2 contractions, one that was really strong. Since then she has had some abd cramps. Patient reports that when she drank her water it had a metallic taste to it. Patient reports taking a covid test before she came here today. Abd non tender to touch. Patient denies any vaginal bleeding, leaking of fluid, or intercourse in the past 24 hours. Patient denies any urinary burning but her urine has a strong odor at times.

## 2023-04-05 NOTE — PROGRESS NOTES
AVS reviewed and signed by pt. Precautions discussed and all questions addressed. Pt understands when to follow up and signs and symptoms to look out for.  Pt has AVS.

## 2023-04-14 PROBLEM — O09.93 SUPERVISION OF HIGH RISK PREGNANCY IN THIRD TRIMESTER: Status: ACTIVE | Noted: 2023-04-14

## 2023-05-02 ENCOUNTER — HOSPITAL ENCOUNTER (OUTPATIENT)
Age: 33
Discharge: HOME | End: 2023-05-02
Attending: OBSTETRICS & GYNECOLOGY | Admitting: OBSTETRICS & GYNECOLOGY
Payer: COMMERCIAL

## 2023-05-02 VITALS
HEIGHT: 65 IN | DIASTOLIC BLOOD PRESSURE: 59 MMHG | TEMPERATURE: 98.4 F | WEIGHT: 277 LBS | BODY MASS INDEX: 46.15 KG/M2 | OXYGEN SATURATION: 97 % | RESPIRATION RATE: 16 BRPM | HEART RATE: 84 BPM | SYSTOLIC BLOOD PRESSURE: 107 MMHG

## 2023-05-02 PROCEDURE — 59025 FETAL NON-STRESS TEST: CPT

## 2023-05-02 PROCEDURE — 59025 FETAL NON-STRESS TEST: CPT | Performed by: ADVANCED PRACTICE MIDWIFE

## 2023-05-02 RX ORDER — ONDANSETRON 4 MG/1
4 TABLET, ORALLY DISINTEGRATING ORAL EVERY 8 HOURS PRN
Status: DISCONTINUED | OUTPATIENT
Start: 2023-05-02 | End: 2023-05-02 | Stop reason: HOSPADM

## 2023-05-02 RX ORDER — ONDANSETRON 2 MG/ML
4 INJECTION INTRAMUSCULAR; INTRAVENOUS EVERY 6 HOURS PRN
Status: DISCONTINUED | OUTPATIENT
Start: 2023-05-02 | End: 2023-05-02 | Stop reason: HOSPADM

## 2023-05-02 NOTE — FLOWSHEET NOTE
EFM and TOCO Off discharge instructions given to follow up as scheduled or sooner as needed, return if baby not moving 4-5 times in an hour, return if has vaginal LOF or bleeding, advised may have some spotting after IC advised should not be heavy advised to return if having UC every 5 minutes times one hour and cant walk or talk through them. Pt voices understanding , preparing for discharge.

## 2023-05-02 NOTE — FLOWSHEET NOTE
EFM and TOCO On pt stats baby has been active and moving pt deneis any tender spots to touch on abdomen, pt denies any vaginal LOF or bleeding, pt states is getting weekly  NST due to gestational hypertension states is on 81 mg asa daily, POC discussed and Ob history updated.

## 2023-05-02 NOTE — PROGRESS NOTES
Department of Obstetrics and Gynecology  Labor and Delivery  TRIAGE NOTE      SUBJECTIVE:    Chief Complaint   Patient presents with    Non-stress Test     Pt reports to triage for NST for gestational HTN. PT BP WNL. PT denies VB, LOF. +FM. OBJECTIVE    Vitals:  /62   Pulse 76   Temp 98.4 °F (36.9 °C) (Oral)   Resp 16   Ht 5' 5\" (1.651 m)   Wt 277 lb (125.6 kg)   SpO2 97%   BMI 46.10 kg/m²       CONSTITUTIONAL:  negative  RESPIRATORY:  negative  CARDIOVASCULAR:  negative  GASTROINTESTINAL:  negative  ALLERGIC/IMMUNOLOGIC:  negative  NEUROLOGICAL:  negative  BEHAVIOR/PSYCH:  negative    Fetal heart rate:        Baseline FHR :    140 bpm              Fetal Accelerations:  present        Fetal Decelerations:  absent        Fetal Variability:   moderate    Contraction frequency: quiet     DATA:  Lab Results   Component Value Date    WBC 7.3 03/01/2023    HGB 12.8 03/01/2023    HCT 39.1 03/01/2023    MCV 85.9 03/01/2023     03/01/2023       ASSESSMENT:   NST for gestational HTN in pregnancy        PLAN: Obtain reactive tracing. Will updated Dr. Van Guzman.         Sandra Osborn, APRDEEDEE - [place]

## 2023-05-04 ENCOUNTER — HOSPITAL ENCOUNTER (OUTPATIENT)
Age: 33
Discharge: HOME | End: 2023-05-04
Attending: OBSTETRICS & GYNECOLOGY | Admitting: OBSTETRICS & GYNECOLOGY
Payer: COMMERCIAL

## 2023-05-04 VITALS
TEMPERATURE: 98.3 F | OXYGEN SATURATION: 99 % | HEART RATE: 81 BPM | RESPIRATION RATE: 19 BRPM | DIASTOLIC BLOOD PRESSURE: 70 MMHG | SYSTOLIC BLOOD PRESSURE: 129 MMHG

## 2023-05-04 PROBLEM — R42 DIZZINESS: Status: ACTIVE | Noted: 2023-05-04

## 2023-05-04 LAB
AMPHETAMINES: NEGATIVE
BARBITURATE SCREEN URINE: NEGATIVE
BENZODIAZEPINE SCREEN, URINE: NEGATIVE
BILIRUBIN URINE: NEGATIVE MG/DL
BLOOD, URINE: NEGATIVE
CANNABINOID SCREEN URINE: NEGATIVE
CLARITY: CLEAR
COCAINE METABOLITE: NEGATIVE
COLOR: YELLOW
COMMENT UA: ABNORMAL
FENTANYL URINE: NEGATIVE
GLUCOSE, URINE: NEGATIVE MG/DL
KETONES, URINE: 40 MG/DL
LEUKOCYTE ESTERASE, URINE: NEGATIVE
NITRITE URINE, QUANTITATIVE: NEGATIVE
OPIATES, URINE: NEGATIVE
OXYCODONE: NEGATIVE
PH, URINE: 5 (ref 5–8)
PROTEIN UA: NEGATIVE MG/DL
SPECIFIC GRAVITY UA: >1.03 (ref 1–1.03)
UROBILINOGEN, URINE: 0.2 MG/DL (ref 0.2–1)

## 2023-05-04 PROCEDURE — 99212 OFFICE O/P EST SF 10 MIN: CPT

## 2023-05-04 PROCEDURE — 80307 DRUG TEST PRSMV CHEM ANLYZR: CPT

## 2023-05-04 PROCEDURE — 81003 URINALYSIS AUTO W/O SCOPE: CPT

## 2023-05-04 NOTE — FLOWSHEET NOTE
Patient arrives to the birthing center ambulatory with complaints of sudden onset dizziness, sweating, and shortness of breath. Patient escorted to [address] [place] [address], instructed to change into gown, provide urine specimen, and oriented to room and call light.

## 2023-05-04 NOTE — FLOWSHEET NOTE
EFM and TOCO applied.     Patient reports being at work and having sudden onset dizziness and then becoming diaphoretic. Patient reports that led to feeling short of breath. Patient states this last for approximately an hour and that she still currently feels dizzy. Patient reports having a bottle of water at the onset of symptoms but not drinking much water the past couple of days. Patient checked her blood sugar and reports normal.  Patient states that this has happened in the past and she was seen here for it. Patient reports +FM, denies vaginal bleeding, denies loss of fluids, and reports intermittent contractions that are not timeable. Patient's abdomen is soft on palpation.

## 2023-05-26 ENCOUNTER — HOSPITAL ENCOUNTER (OUTPATIENT)
Age: 33
Discharge: HOME | End: 2023-05-26
Attending: OBSTETRICS & GYNECOLOGY | Admitting: OBSTETRICS & GYNECOLOGY
Payer: COMMERCIAL

## 2023-05-26 VITALS
HEART RATE: 86 BPM | SYSTOLIC BLOOD PRESSURE: 109 MMHG | DIASTOLIC BLOOD PRESSURE: 64 MMHG | RESPIRATION RATE: 18 BRPM | OXYGEN SATURATION: 97 % | TEMPERATURE: 98.1 F

## 2023-05-26 PROBLEM — O26.93 PREGNANCY RELATED CONDITION IN THIRD TRIMESTER: Status: ACTIVE | Noted: 2023-05-26

## 2023-05-26 LAB
ALBUMIN SERPL-MCNC: 3.3 GM/DL (ref 3.4–5)
ALP BLD-CCNC: 93 IU/L (ref 40–128)
ALT SERPL-CCNC: 17 U/L (ref 10–40)
AMPHETAMINES: NEGATIVE
ANION GAP SERPL CALCULATED.3IONS-SCNC: 12 MMOL/L (ref 4–16)
AST SERPL-CCNC: 17 IU/L (ref 15–37)
BACTERIA: ABNORMAL /HPF
BARBITURATE SCREEN URINE: NEGATIVE
BASOPHILS ABSOLUTE: 0 K/CU MM
BASOPHILS RELATIVE PERCENT: 0.3 % (ref 0–1)
BENZODIAZEPINE SCREEN, URINE: NEGATIVE
BILIRUB SERPL-MCNC: 0.3 MG/DL (ref 0–1)
BILIRUBIN URINE: NEGATIVE MG/DL
BLOOD, URINE: NEGATIVE
BUN SERPL-MCNC: 5 MG/DL (ref 6–23)
CALCIUM SERPL-MCNC: 8.4 MG/DL (ref 8.3–10.6)
CANNABINOID SCREEN URINE: NEGATIVE
CHLORIDE BLD-SCNC: 104 MMOL/L (ref 99–110)
CLARITY: ABNORMAL
CO2: 20 MMOL/L (ref 21–32)
COCAINE METABOLITE: NEGATIVE
COLOR: YELLOW
CREAT SERPL-MCNC: 0.4 MG/DL (ref 0.6–1.1)
CREATININE URINE: 113.8 MG/DL (ref 28–217)
DIFFERENTIAL TYPE: ABNORMAL
EOSINOPHILS ABSOLUTE: 0.2 K/CU MM
EOSINOPHILS RELATIVE PERCENT: 2.5 % (ref 0–3)
FENTANYL URINE: NEGATIVE
GFR SERPL CREATININE-BSD FRML MDRD: >60 ML/MIN/1.73M2
GLUCOSE SERPL-MCNC: 151 MG/DL (ref 70–99)
GLUCOSE, URINE: NEGATIVE MG/DL
HCT VFR BLD CALC: 39.4 % (ref 37–47)
HEMOGLOBIN: 13.3 GM/DL (ref 12.5–16)
IMMATURE NEUTROPHIL %: 0.5 % (ref 0–0.43)
KETONES, URINE: >80 MG/DL
LEUKOCYTE ESTERASE, URINE: ABNORMAL
LYMPHOCYTES ABSOLUTE: 1.7 K/CU MM
LYMPHOCYTES RELATIVE PERCENT: 21.9 % (ref 24–44)
MCH RBC QN AUTO: 29.2 PG (ref 27–31)
MCHC RBC AUTO-ENTMCNC: 33.8 % (ref 32–36)
MCV RBC AUTO: 86.4 FL (ref 78–100)
MONOCYTES ABSOLUTE: 0.4 K/CU MM
MONOCYTES RELATIVE PERCENT: 5.2 % (ref 0–4)
MUCUS: ABNORMAL HPF
NITRITE URINE, QUANTITATIVE: NEGATIVE
NUCLEATED RBC %: 0 %
OPIATES, URINE: NEGATIVE
OXYCODONE: NEGATIVE
PDW BLD-RTO: 14.2 % (ref 11.7–14.9)
PH, URINE: 5.5 (ref 5–8)
PLATELET # BLD: 162 K/CU MM (ref 140–440)
PMV BLD AUTO: 11.3 FL (ref 7.5–11.1)
POTASSIUM SERPL-SCNC: 3.8 MMOL/L (ref 3.5–5.1)
PROT/CREAT RATIO, UR: 0.2
PROTEIN UA: NEGATIVE MG/DL
RBC # BLD: 4.56 M/CU MM (ref 4.2–5.4)
RBC URINE: 2 /HPF (ref 0–6)
SEGMENTED NEUTROPHILS ABSOLUTE COUNT: 5.3 K/CU MM
SEGMENTED NEUTROPHILS RELATIVE PERCENT: 69.6 % (ref 36–66)
SODIUM BLD-SCNC: 136 MMOL/L (ref 135–145)
SPECIFIC GRAVITY UA: 1.02 (ref 1–1.03)
SQUAMOUS EPITHELIAL: 27 /HPF
TOTAL IMMATURE NEUTOROPHIL: 0.04 K/CU MM
TOTAL NUCLEATED RBC: 0 K/CU MM
TOTAL PROTEIN: 5.1 GM/DL (ref 6.4–8.2)
TRICHOMONAS: ABNORMAL /HPF
URINE TOTAL PROTEIN: 25.7 MG/DL
UROBILINOGEN, URINE: 1 MG/DL (ref 0.2–1)
WBC # BLD: 7.6 K/CU MM (ref 4–10.5)
WBC UA: 9 /HPF (ref 0–5)

## 2023-05-26 PROCEDURE — 81001 URINALYSIS AUTO W/SCOPE: CPT

## 2023-05-26 PROCEDURE — 84156 ASSAY OF PROTEIN URINE: CPT

## 2023-05-26 PROCEDURE — 80307 DRUG TEST PRSMV CHEM ANLYZR: CPT

## 2023-05-26 PROCEDURE — 82570 ASSAY OF URINE CREATININE: CPT

## 2023-05-26 PROCEDURE — 59025 FETAL NON-STRESS TEST: CPT | Performed by: ADVANCED PRACTICE MIDWIFE

## 2023-05-26 PROCEDURE — 80053 COMPREHEN METABOLIC PANEL: CPT

## 2023-05-26 PROCEDURE — 85025 COMPLETE CBC W/AUTO DIFF WBC: CPT

## 2023-05-26 RX ORDER — CETIRIZINE HYDROCHLORIDE 10 MG/1
10 TABLET ORAL DAILY
COMMUNITY
Start: 2023-05-20

## 2023-05-26 NOTE — PROGRESS NOTES
[unfilled]    Xander Herrera  1990    Chief Complaint   Patient presents with    Pregnancy Problem     Nst & BPH labs          Xander Herrera is a 28 y.o. female who presents today for NST and labs because of  gHTN . The baseline fetal heart rate is 135. It is category I. The variability is moderate. Decelerations are absent. Accelerations are 15 beats/min or greater      No current facility-administered medications for this encounter. Allergies   Allergen Reactions    Darvocet [Propoxyphene N-Acetaminophen] Hives and Itching    Dilaudid [Hydromorphone Hcl] Hives    Hydromorphone Hives    Hydromorphone Hcl Hives           /61   Pulse 89   Temp 98.1 °F (36.7 °C) (Oral)   Resp 18   SpO2 98%       ASSESSMENT   29 yo  at 34 weeks  gHTN; BP WNL today   Cat I FHR tracing reactive NST     PLAN    BP and labs WNL today. Pt denies HA worse from baseline, visual changes or epigastric pain. Will discharge home with Pre-E and  labor precautions. Pt has appt at [facility] on Tuesday; knows to keep.      MICAH Marie CNM

## 2023-05-26 NOTE — PROGRESS NOTES
Pt placed on US and [place] monitors, triage tab completed. Pt reports good fetal movement and denies and loss of fluid of vaginal bleeding. Labs drawn and sent to lab.

## 2023-05-26 NOTE — PROGRESS NOTES
Pt reports to triage ambulatory for NST and weekly [address] labs.  Shown to TR03 and instructed to put on a gown and give clean catch urine sample

## 2023-05-26 NOTE — DISCHARGE INSTRUCTIONS
Preeclampsia: Care Instructions  Preeclampsia is a condition that can happen when your blood pressure rises during pregnancy. If it's severe and not treated, it can cause seizures and damage to your liver or kidneys. It can also prevent your baby from getting enough nutrients and oxygen. This can cause low birth weight and other problems. Most people with preeclampsia have healthy babies. Preeclampsia usually goes away in the weeks after birth. In rare cases, symptoms of preeclampsia don't show up until days or weeks after childbirth. Keep track of your blood pressure at home if your doctor asks you to. Ask your doctor to make sure that the monitor is working and that you're using it right. Follow instructions about when to take your blood pressure and what to avoid before taking your blood pressure. Take medicines exactly as prescribed. You may need to take medicine to control your blood pressure. Don't smoke. If you need help quitting, talk to your doctor. Check your baby's movements. Once each day, time how long it takes to count 10 movements. If you don't feel at least 10 movements in 2 hours, call your doctor. When should you call for help? Share this information with your partner or a friend. They can help you watch for warning signs. Call 911  anytime you think you may need emergency care. For example, call if:    You passed out (lost consciousness). You have a seizure. You have trouble breathing. You have chest pain. Call your doctor now or seek immediate medical care if:    You have:  Sudden swelling of your face, hands, or feet. New vision problems (such as dimness, blurring, or seeing spots). A severe headache. Your blood pressure is very high, such as 160/110 or higher. Or your blood pressure is higher than your doctor told you it should be, or it rises quickly. You have new nausea or vomiting. You think that you are in labor.      You have

## 2023-05-30 LAB — GBS, EXTERNAL RESULT: NEGATIVE

## 2023-06-03 ENCOUNTER — HOSPITAL ENCOUNTER (OUTPATIENT)
Age: 33
Discharge: HOME | End: 2023-06-03
Attending: OBSTETRICS & GYNECOLOGY | Admitting: OBSTETRICS & GYNECOLOGY
Payer: COMMERCIAL

## 2023-06-03 VITALS
OXYGEN SATURATION: 98 % | HEART RATE: 80 BPM | TEMPERATURE: 98.4 F | RESPIRATION RATE: 16 BRPM | DIASTOLIC BLOOD PRESSURE: 67 MMHG | SYSTOLIC BLOOD PRESSURE: 123 MMHG

## 2023-06-03 PROCEDURE — 99213 OFFICE O/P EST LOW 20 MIN: CPT

## 2023-06-03 PROCEDURE — 6370000000 HC RX 637 (ALT 250 FOR IP): Performed by: OBSTETRICS & GYNECOLOGY

## 2023-06-03 RX ORDER — BUTALBITAL, ACETAMINOPHEN AND CAFFEINE 50; 325; 40 MG/1; MG/1; MG/1
1 TABLET ORAL ONCE
Status: COMPLETED | OUTPATIENT
Start: 2023-06-03 | End: 2023-06-03

## 2023-06-03 RX ADMIN — BUTALBITAL, ACETAMINOPHEN, AND CAFFEINE 1 TABLET: 325; 50; 40 TABLET ORAL at 21:42

## 2023-06-03 ASSESSMENT — PAIN DESCRIPTION - DESCRIPTORS: DESCRIPTORS: POUNDING

## 2023-06-03 ASSESSMENT — PAIN SCALES - GENERAL: PAINLEVEL_OUTOF10: 8

## 2023-06-03 ASSESSMENT — PAIN DESCRIPTION - LOCATION: LOCATION: HEAD

## 2023-06-03 ASSESSMENT — PAIN DESCRIPTION - ORIENTATION: ORIENTATION: MID

## 2023-06-03 ASSESSMENT — PAIN - FUNCTIONAL ASSESSMENT: PAIN_FUNCTIONAL_ASSESSMENT: ACTIVITIES ARE NOT PREVENTED

## 2023-06-09 ENCOUNTER — HOSPITAL ENCOUNTER (OUTPATIENT)
Age: 33
Discharge: HOME | End: 2023-06-09
Attending: OBSTETRICS & GYNECOLOGY | Admitting: OBSTETRICS & GYNECOLOGY
Payer: COMMERCIAL

## 2023-06-09 VITALS
HEART RATE: 89 BPM | TEMPERATURE: 98.3 F | DIASTOLIC BLOOD PRESSURE: 62 MMHG | SYSTOLIC BLOOD PRESSURE: 116 MMHG | OXYGEN SATURATION: 99 % | RESPIRATION RATE: 16 BRPM

## 2023-06-09 LAB
ALBUMIN SERPL-MCNC: 3.3 GM/DL (ref 3.4–5)
ALP BLD-CCNC: 107 IU/L (ref 40–128)
ALT SERPL-CCNC: 18 U/L (ref 10–40)
ANION GAP SERPL CALCULATED.3IONS-SCNC: 12 MMOL/L (ref 4–16)
AST SERPL-CCNC: 17 IU/L (ref 15–37)
BASOPHILS ABSOLUTE: 0 K/CU MM
BASOPHILS RELATIVE PERCENT: 0.2 % (ref 0–1)
BILIRUB SERPL-MCNC: 0.3 MG/DL (ref 0–1)
BUN SERPL-MCNC: 5 MG/DL (ref 6–23)
CALCIUM SERPL-MCNC: 8.6 MG/DL (ref 8.3–10.6)
CHLORIDE BLD-SCNC: 106 MMOL/L (ref 99–110)
CO2: 20 MMOL/L (ref 21–32)
CREAT SERPL-MCNC: 0.5 MG/DL (ref 0.6–1.1)
CREATININE URINE: 186.9 MG/DL (ref 28–217)
DIFFERENTIAL TYPE: ABNORMAL
EOSINOPHILS ABSOLUTE: 0.1 K/CU MM
EOSINOPHILS RELATIVE PERCENT: 0.9 % (ref 0–3)
GFR SERPL CREATININE-BSD FRML MDRD: >60 ML/MIN/1.73M2
GLUCOSE SERPL-MCNC: 80 MG/DL (ref 70–99)
HCT VFR BLD CALC: 39.6 % (ref 37–47)
HEMOGLOBIN: 13.2 GM/DL (ref 12.5–16)
IMMATURE NEUTROPHIL %: 0.5 % (ref 0–0.43)
LYMPHOCYTES ABSOLUTE: 1.7 K/CU MM
LYMPHOCYTES RELATIVE PERCENT: 19.4 % (ref 24–44)
MCH RBC QN AUTO: 28.9 PG (ref 27–31)
MCHC RBC AUTO-ENTMCNC: 33.3 % (ref 32–36)
MCV RBC AUTO: 86.7 FL (ref 78–100)
MONOCYTES ABSOLUTE: 0.5 K/CU MM
MONOCYTES RELATIVE PERCENT: 5.8 % (ref 0–4)
NUCLEATED RBC %: 0 %
PDW BLD-RTO: 14.3 % (ref 11.7–14.9)
PLATELET # BLD: 167 K/CU MM (ref 140–440)
PMV BLD AUTO: 11.3 FL (ref 7.5–11.1)
POTASSIUM SERPL-SCNC: 4 MMOL/L (ref 3.5–5.1)
PROT/CREAT RATIO, UR: 0.1
RBC # BLD: 4.57 M/CU MM (ref 4.2–5.4)
SEGMENTED NEUTROPHILS ABSOLUTE COUNT: 6.2 K/CU MM
SEGMENTED NEUTROPHILS RELATIVE PERCENT: 73.2 % (ref 36–66)
SODIUM BLD-SCNC: 138 MMOL/L (ref 135–145)
TOTAL IMMATURE NEUTOROPHIL: 0.04 K/CU MM
TOTAL NUCLEATED RBC: 0 K/CU MM
TOTAL PROTEIN: 5.2 GM/DL (ref 6.4–8.2)
URINE TOTAL PROTEIN: 17.1 MG/DL
WBC # BLD: 8.5 K/CU MM (ref 4–10.5)

## 2023-06-09 PROCEDURE — 80053 COMPREHEN METABOLIC PANEL: CPT

## 2023-06-09 PROCEDURE — 84156 ASSAY OF PROTEIN URINE: CPT

## 2023-06-09 PROCEDURE — 85025 COMPLETE CBC W/AUTO DIFF WBC: CPT

## 2023-06-09 PROCEDURE — 82570 ASSAY OF URINE CREATININE: CPT

## 2023-06-09 PROCEDURE — 59025 FETAL NON-STRESS TEST: CPT

## 2023-06-09 NOTE — DISCHARGE INSTRUCTIONS
LABOR    Call your doctor if you have these signs:     Regular tightening of the uterus coming as often as once every 15 minutes     Menstrual-like cramps, they may be regular, or may be continuous and move to   your back. A low, dull backache different from what you normally experience. It may come and go. Vaginal leaking of fluid. Any bleeding from your vagina. Pain, burning or bleeding when you urinate. LABOR    Call your doctor, or come to the hospital, if you have:    Contractions coming about every 3-5 minutes apart, for about one hour. Labor contractions are usually strong enough that you can not walk or talk while having the contractions. ( Contractions can feel like menstrual cramps, tightening in your abdomen, rectal pressure or a backache. This feeling comes and goes.)    Vaginal leaking of fluid. Heavy, bright red bleeding, like the days of your period. ( A little bloody show or spotting is normal in labor.)    ALWAYS CALL YOUR DOCTOR IF YOU:    Notice decreased movement of your baby, different from what you are used to. Have heavy, bright red bleeding, like the heavy days of your periods. Have vaginal leaking of fluid.

## 2023-06-09 NOTE — FLOWSHEET NOTE
Discharge instructions and follow up care reviewed. Patient instructed to keep her next scheduled appointment. Call doctor or return to LD unit for decreased fetal movement, vaginal bleeding, or leaking of fluid. Patient verbalized understanding.

## 2023-06-09 NOTE — FLOWSHEET NOTE
Patient ambulatory to the LD department for a scheduled NST. Patient brought with her [address] lab slip. She reports a headache but states this has been her normal. Also has complaints of increasing discomfort d/t hemorrhoids. Denies any painful contractions, leaking of fluid or vaginal bleeding. Reports active fetal movement. EFM and [place] on and tracing.

## 2023-06-09 NOTE — PROGRESS NOTES
[unfilled]    Elizabeth Lopez  1990    Chief Complaint   Patient presents with    Non-stress Test        March Jessica is a 28 y.o. female who presents today for NST because of  GHTN . The baseline fetal heart rate is 140. It is category I. The variability is moderate. Decelerations are absent. Accelerations are 15 beats/min or greater. Interpretation: reactive          Allergies   Allergen Reactions    Darvocet [Propoxyphene N-Acetaminophen] Hives and Itching    Dilaudid [Hydromorphone Hcl] Hives    Hydromorphone Hives    Hydromorphone Hcl Hives           /72   Pulse 90   Temp 98.1 °F (36.7 °C) (Oral)   Resp 18     Physical Exam        ASSESSMENT AND PLAN  NST- Ghtn   NST- reactive   CBC, CMP, UPC drawn weekly.    Will discharge pt home   Follow up in office for routine OB appointment       MICAH Cruz CNM

## 2023-06-22 ENCOUNTER — HOSPITAL ENCOUNTER (INPATIENT)
Age: 33
LOS: 4 days | Discharge: HOME | DRG: 540 | End: 2023-06-26
Attending: OBSTETRICS & GYNECOLOGY | Admitting: OBSTETRICS & GYNECOLOGY
Payer: COMMERCIAL

## 2023-06-22 ENCOUNTER — ANESTHESIA (OUTPATIENT)
Dept: LABOR AND DELIVERY | Age: 33
End: 2023-06-22
Payer: COMMERCIAL

## 2023-06-22 ENCOUNTER — ANESTHESIA EVENT (OUTPATIENT)
Dept: LABOR AND DELIVERY | Age: 33
End: 2023-06-22
Payer: COMMERCIAL

## 2023-06-22 LAB
ABO/RH: NORMAL
ANTIBODY SCREEN: NEGATIVE
HCT VFR BLD CALC: 39.5 % (ref 37–47)
HEMOGLOBIN: 13.5 GM/DL (ref 12.5–16)
MCH RBC QN AUTO: 29.3 PG (ref 27–31)
MCHC RBC AUTO-ENTMCNC: 34.2 % (ref 32–36)
MCV RBC AUTO: 85.9 FL (ref 78–100)
PDW BLD-RTO: 14 % (ref 11.7–14.9)
PLATELET # BLD: 159 K/CU MM (ref 140–440)
PMV BLD AUTO: 11.4 FL (ref 7.5–11.1)
RBC # BLD: 4.6 M/CU MM (ref 4.2–5.4)
WBC # BLD: 7.6 K/CU MM (ref 4–10.5)

## 2023-06-22 PROCEDURE — 1220000000 HC SEMI PRIVATE OB R&B

## 2023-06-22 PROCEDURE — 2580000003 HC RX 258

## 2023-06-22 PROCEDURE — 2580000003 HC RX 258: Performed by: OBSTETRICS & GYNECOLOGY

## 2023-06-22 PROCEDURE — 6370000000 HC RX 637 (ALT 250 FOR IP)

## 2023-06-22 PROCEDURE — 2500000003 HC RX 250 WO HCPCS: Performed by: NURSE ANESTHETIST, CERTIFIED REGISTERED

## 2023-06-22 PROCEDURE — 86850 RBC ANTIBODY SCREEN: CPT

## 2023-06-22 PROCEDURE — 6370000000 HC RX 637 (ALT 250 FOR IP): Performed by: OBSTETRICS & GYNECOLOGY

## 2023-06-22 PROCEDURE — 7100000001 HC PACU RECOVERY - ADDTL 15 MIN: Performed by: OBSTETRICS & GYNECOLOGY

## 2023-06-22 PROCEDURE — 6360000002 HC RX W HCPCS: Performed by: NURSE ANESTHETIST, CERTIFIED REGISTERED

## 2023-06-22 PROCEDURE — 2580000003 HC RX 258: Performed by: NURSE ANESTHETIST, CERTIFIED REGISTERED

## 2023-06-22 PROCEDURE — 3700000001 HC ADD 15 MINUTES (ANESTHESIA): Performed by: OBSTETRICS & GYNECOLOGY

## 2023-06-22 PROCEDURE — 3700000000 HC ANESTHESIA ATTENDED CARE: Performed by: OBSTETRICS & GYNECOLOGY

## 2023-06-22 PROCEDURE — 6360000002 HC RX W HCPCS: Performed by: OBSTETRICS & GYNECOLOGY

## 2023-06-22 PROCEDURE — 85027 COMPLETE CBC AUTOMATED: CPT

## 2023-06-22 PROCEDURE — 59514 CESAREAN DELIVERY ONLY: CPT

## 2023-06-22 PROCEDURE — 6360000002 HC RX W HCPCS

## 2023-06-22 PROCEDURE — 86901 BLOOD TYPING SEROLOGIC RH(D): CPT

## 2023-06-22 PROCEDURE — 86900 BLOOD TYPING SEROLOGIC ABO: CPT

## 2023-06-22 PROCEDURE — 3609079900 HC CESAREAN SECTION: Performed by: OBSTETRICS & GYNECOLOGY

## 2023-06-22 PROCEDURE — 7100000000 HC PACU RECOVERY - FIRST 15 MIN: Performed by: OBSTETRICS & GYNECOLOGY

## 2023-06-22 RX ORDER — SODIUM CHLORIDE, SODIUM LACTATE, POTASSIUM CHLORIDE, CALCIUM CHLORIDE 600; 310; 30; 20 MG/100ML; MG/100ML; MG/100ML; MG/100ML
INJECTION, SOLUTION INTRAVENOUS CONTINUOUS PRN
Status: DISCONTINUED | OUTPATIENT
Start: 2023-06-22 | End: 2023-06-22 | Stop reason: SDUPTHER

## 2023-06-22 RX ORDER — METHYLERGONOVINE MALEATE 0.2 MG/ML
200 INJECTION INTRAVENOUS PRN
Status: DISCONTINUED | OUTPATIENT
Start: 2023-06-22 | End: 2023-06-26 | Stop reason: HOSPADM

## 2023-06-22 RX ORDER — TRISODIUM CITRATE DIHYDRATE AND CITRIC ACID MONOHYDRATE 500; 334 MG/5ML; MG/5ML
30 SOLUTION ORAL ONCE
Status: COMPLETED | OUTPATIENT
Start: 2023-06-22 | End: 2023-06-22

## 2023-06-22 RX ORDER — SODIUM CHLORIDE, SODIUM LACTATE, POTASSIUM CHLORIDE, CALCIUM CHLORIDE 600; 310; 30; 20 MG/100ML; MG/100ML; MG/100ML; MG/100ML
INJECTION, SOLUTION INTRAVENOUS CONTINUOUS
Status: DISCONTINUED | OUTPATIENT
Start: 2023-06-22 | End: 2023-06-26 | Stop reason: HOSPADM

## 2023-06-22 RX ORDER — SODIUM CHLORIDE 0.9 % (FLUSH) 0.9 %
5-40 SYRINGE (ML) INJECTION PRN
Status: DISCONTINUED | OUTPATIENT
Start: 2023-06-22 | End: 2023-06-26 | Stop reason: HOSPADM

## 2023-06-22 RX ORDER — MORPHINE SULFATE 0.5 MG/ML
INJECTION, SOLUTION EPIDURAL; INTRATHECAL; INTRAVENOUS
Status: COMPLETED | OUTPATIENT
Start: 2023-06-22 | End: 2023-06-22

## 2023-06-22 RX ORDER — ONDANSETRON 2 MG/ML
4 INJECTION INTRAMUSCULAR; INTRAVENOUS EVERY 6 HOURS PRN
Status: DISCONTINUED | OUTPATIENT
Start: 2023-06-22 | End: 2023-06-22

## 2023-06-22 RX ORDER — EPHEDRINE SULFATE 50 MG/ML
INJECTION INTRAVENOUS PRN
Status: DISCONTINUED | OUTPATIENT
Start: 2023-06-22 | End: 2023-06-22 | Stop reason: SDUPTHER

## 2023-06-22 RX ORDER — MISOPROSTOL 200 UG/1
800 TABLET ORAL PRN
Status: DISCONTINUED | OUTPATIENT
Start: 2023-06-22 | End: 2023-06-26 | Stop reason: HOSPADM

## 2023-06-22 RX ORDER — ASPIRIN 81 MG/1
81 TABLET, CHEWABLE ORAL DAILY
Status: DISCONTINUED | OUTPATIENT
Start: 2023-06-22 | End: 2023-06-26 | Stop reason: HOSPADM

## 2023-06-22 RX ORDER — KETOROLAC TROMETHAMINE 30 MG/ML
INJECTION, SOLUTION INTRAMUSCULAR; INTRAVENOUS PRN
Status: DISCONTINUED | OUTPATIENT
Start: 2023-06-22 | End: 2023-06-22 | Stop reason: SDUPTHER

## 2023-06-22 RX ORDER — ONDANSETRON 2 MG/ML
INJECTION INTRAMUSCULAR; INTRAVENOUS PRN
Status: DISCONTINUED | OUTPATIENT
Start: 2023-06-22 | End: 2023-06-22 | Stop reason: SDUPTHER

## 2023-06-22 RX ORDER — SODIUM CHLORIDE, SODIUM LACTATE, POTASSIUM CHLORIDE, CALCIUM CHLORIDE 600; 310; 30; 20 MG/100ML; MG/100ML; MG/100ML; MG/100ML
INJECTION, SOLUTION INTRAVENOUS CONTINUOUS
Status: DISCONTINUED | OUTPATIENT
Start: 2023-06-22 | End: 2023-06-22

## 2023-06-22 RX ORDER — NALOXONE HYDROCHLORIDE 0.4 MG/ML
INJECTION, SOLUTION INTRAMUSCULAR; INTRAVENOUS; SUBCUTANEOUS PRN
Status: ACTIVE | OUTPATIENT
Start: 2023-06-22 | End: 2023-06-23

## 2023-06-22 RX ORDER — NALBUPHINE HYDROCHLORIDE 10 MG/ML
10 INJECTION, SOLUTION INTRAMUSCULAR; INTRAVENOUS; SUBCUTANEOUS
Status: DISCONTINUED | OUTPATIENT
Start: 2023-06-22 | End: 2023-06-26 | Stop reason: HOSPADM

## 2023-06-22 RX ORDER — DOCUSATE SODIUM 100 MG/1
100 CAPSULE, LIQUID FILLED ORAL 2 TIMES DAILY
Status: DISCONTINUED | OUTPATIENT
Start: 2023-06-22 | End: 2023-06-26 | Stop reason: HOSPADM

## 2023-06-22 RX ORDER — ACETAMINOPHEN 500 MG
1000 TABLET ORAL EVERY 8 HOURS SCHEDULED
Status: DISCONTINUED | OUTPATIENT
Start: 2023-06-22 | End: 2023-06-26 | Stop reason: HOSPADM

## 2023-06-22 RX ORDER — PHENYLEPHRINE HCL IN 0.9% NACL 1 MG/10 ML
SYRINGE (ML) INTRAVENOUS PRN
Status: DISCONTINUED | OUTPATIENT
Start: 2023-06-22 | End: 2023-06-22 | Stop reason: SDUPTHER

## 2023-06-22 RX ORDER — LEVOTHYROXINE SODIUM 88 UG/1
88 TABLET ORAL DAILY
Status: DISCONTINUED | OUTPATIENT
Start: 2023-06-23 | End: 2023-06-26 | Stop reason: HOSPADM

## 2023-06-22 RX ORDER — IBUPROFEN 800 MG/1
800 TABLET ORAL EVERY 8 HOURS SCHEDULED
Status: DISCONTINUED | OUTPATIENT
Start: 2023-06-22 | End: 2023-06-26 | Stop reason: HOSPADM

## 2023-06-22 RX ORDER — ONDANSETRON 2 MG/ML
4 INJECTION INTRAMUSCULAR; INTRAVENOUS ONCE
Status: COMPLETED | OUTPATIENT
Start: 2023-06-22 | End: 2023-06-22

## 2023-06-22 RX ORDER — OXYCODONE HYDROCHLORIDE 5 MG/1
10 TABLET ORAL EVERY 4 HOURS PRN
Status: DISCONTINUED | OUTPATIENT
Start: 2023-06-22 | End: 2023-06-26 | Stop reason: HOSPADM

## 2023-06-22 RX ORDER — SODIUM CHLORIDE 9 MG/ML
INJECTION, SOLUTION INTRAVENOUS PRN
Status: DISCONTINUED | OUTPATIENT
Start: 2023-06-22 | End: 2023-06-26 | Stop reason: HOSPADM

## 2023-06-22 RX ORDER — DEXAMETHASONE SODIUM PHOSPHATE 4 MG/ML
INJECTION, SOLUTION INTRA-ARTICULAR; INTRALESIONAL; INTRAMUSCULAR; INTRAVENOUS; SOFT TISSUE PRN
Status: DISCONTINUED | OUTPATIENT
Start: 2023-06-22 | End: 2023-06-22 | Stop reason: SDUPTHER

## 2023-06-22 RX ORDER — SODIUM CHLORIDE 0.9 % (FLUSH) 0.9 %
5-40 SYRINGE (ML) INJECTION EVERY 12 HOURS SCHEDULED
Status: DISCONTINUED | OUTPATIENT
Start: 2023-06-22 | End: 2023-06-26 | Stop reason: HOSPADM

## 2023-06-22 RX ORDER — KETOROLAC TROMETHAMINE 30 MG/ML
30 INJECTION, SOLUTION INTRAMUSCULAR; INTRAVENOUS EVERY 6 HOURS
Status: DISPENSED | OUTPATIENT
Start: 2023-06-22 | End: 2023-06-23

## 2023-06-22 RX ORDER — OXYCODONE HYDROCHLORIDE 5 MG/1
5 TABLET ORAL EVERY 4 HOURS PRN
Status: DISCONTINUED | OUTPATIENT
Start: 2023-06-22 | End: 2023-06-26 | Stop reason: HOSPADM

## 2023-06-22 RX ORDER — SWAB
1 SWAB, NON-MEDICATED MISCELLANEOUS DAILY
Status: DISCONTINUED | OUTPATIENT
Start: 2023-06-22 | End: 2023-06-26 | Stop reason: HOSPADM

## 2023-06-22 RX ORDER — DIPHENHYDRAMINE HYDROCHLORIDE 50 MG/ML
25 INJECTION INTRAMUSCULAR; INTRAVENOUS EVERY 6 HOURS PRN
Status: DISCONTINUED | OUTPATIENT
Start: 2023-06-22 | End: 2023-06-26 | Stop reason: HOSPADM

## 2023-06-22 RX ORDER — SODIUM CHLORIDE, SODIUM LACTATE, POTASSIUM CHLORIDE, AND CALCIUM CHLORIDE .6; .31; .03; .02 G/100ML; G/100ML; G/100ML; G/100ML
1000 INJECTION, SOLUTION INTRAVENOUS ONCE
Status: COMPLETED | OUTPATIENT
Start: 2023-06-22 | End: 2023-06-22

## 2023-06-22 RX ORDER — LANOLIN 72 %
OINTMENT (GRAM) TOPICAL
Status: DISCONTINUED | OUTPATIENT
Start: 2023-06-22 | End: 2023-06-26 | Stop reason: HOSPADM

## 2023-06-22 RX ORDER — CARBOPROST TROMETHAMINE 250 UG/ML
250 INJECTION, SOLUTION INTRAMUSCULAR PRN
Status: DISCONTINUED | OUTPATIENT
Start: 2023-06-22 | End: 2023-06-26 | Stop reason: HOSPADM

## 2023-06-22 RX ORDER — ONDANSETRON 2 MG/ML
4 INJECTION INTRAMUSCULAR; INTRAVENOUS EVERY 6 HOURS PRN
Status: DISCONTINUED | OUTPATIENT
Start: 2023-06-22 | End: 2023-06-26 | Stop reason: HOSPADM

## 2023-06-22 RX ORDER — BUPIVACAINE HYDROCHLORIDE 7.5 MG/ML
INJECTION, SOLUTION INTRASPINAL
Status: COMPLETED | OUTPATIENT
Start: 2023-06-22 | End: 2023-06-22

## 2023-06-22 RX ADMIN — DIPHENHYDRAMINE HYDROCHLORIDE 25 MG: 50 INJECTION, SOLUTION INTRAMUSCULAR; INTRAVENOUS at 11:44

## 2023-06-22 RX ADMIN — Medication 909 ML/HR: at 08:17

## 2023-06-22 RX ADMIN — DOCUSATE SODIUM 100 MG: 100 CAPSULE, LIQUID FILLED ORAL at 12:01

## 2023-06-22 RX ADMIN — Medication 200 MCG: at 08:01

## 2023-06-22 RX ADMIN — KETOROLAC TROMETHAMINE 30 MG: 30 INJECTION, SOLUTION INTRAMUSCULAR; INTRAVENOUS at 08:50

## 2023-06-22 RX ADMIN — SODIUM CHLORIDE, POTASSIUM CHLORIDE, SODIUM LACTATE AND CALCIUM CHLORIDE: 600; 310; 30; 20 INJECTION, SOLUTION INTRAVENOUS at 07:45

## 2023-06-22 RX ADMIN — Medication 100 MCG: at 07:51

## 2023-06-22 RX ADMIN — CEFAZOLIN 2000 MG: 2 INJECTION, POWDER, FOR SOLUTION INTRAMUSCULAR; INTRAVENOUS at 07:36

## 2023-06-22 RX ADMIN — MORPHINE SULFATE 0.15 MG: 0.5 INJECTION, SOLUTION EPIDURAL; INTRATHECAL; INTRAVENOUS at 07:48

## 2023-06-22 RX ADMIN — SODIUM CHLORIDE, POTASSIUM CHLORIDE, SODIUM LACTATE AND CALCIUM CHLORIDE 1000 ML: 600; 310; 30; 20 INJECTION, SOLUTION INTRAVENOUS at 06:35

## 2023-06-22 RX ADMIN — DOCUSATE SODIUM 100 MG: 100 CAPSULE, LIQUID FILLED ORAL at 20:54

## 2023-06-22 RX ADMIN — ONDANSETRON 4 MG: 2 INJECTION INTRAMUSCULAR; INTRAVENOUS at 07:31

## 2023-06-22 RX ADMIN — ACETAMINOPHEN 1000 MG: 500 TABLET ORAL at 18:25

## 2023-06-22 RX ADMIN — EPHEDRINE SULFATE 10 MG: 50 INJECTION INTRAVENOUS at 07:55

## 2023-06-22 RX ADMIN — ASPIRIN 81 MG CHEWABLE TABLET 81 MG: 81 TABLET CHEWABLE at 12:01

## 2023-06-22 RX ADMIN — SODIUM CITRATE AND CITRIC ACID MONOHYDRATE 30 ML: 500; 334 SOLUTION ORAL at 07:31

## 2023-06-22 RX ADMIN — SODIUM CHLORIDE, POTASSIUM CHLORIDE, SODIUM LACTATE AND CALCIUM CHLORIDE: 600; 310; 30; 20 INJECTION, SOLUTION INTRAVENOUS at 08:54

## 2023-06-22 RX ADMIN — Medication 200 MCG: at 07:56

## 2023-06-22 RX ADMIN — OXYCODONE HYDROCHLORIDE 10 MG: 5 TABLET ORAL at 23:47

## 2023-06-22 RX ADMIN — Medication 200 MCG: at 08:09

## 2023-06-22 RX ADMIN — SODIUM CHLORIDE, POTASSIUM CHLORIDE, SODIUM LACTATE AND CALCIUM CHLORIDE: 600; 310; 30; 20 INJECTION, SOLUTION INTRAVENOUS at 07:44

## 2023-06-22 RX ADMIN — SODIUM CHLORIDE, POTASSIUM CHLORIDE, SODIUM LACTATE AND CALCIUM CHLORIDE: 600; 310; 30; 20 INJECTION, SOLUTION INTRAVENOUS at 14:39

## 2023-06-22 RX ADMIN — EPHEDRINE SULFATE 10 MG: 50 INJECTION INTRAVENOUS at 08:12

## 2023-06-22 RX ADMIN — NALBUPHINE HYDROCHLORIDE 10 MG: 10 INJECTION, SOLUTION INTRAMUSCULAR; INTRAVENOUS; SUBCUTANEOUS at 13:07

## 2023-06-22 RX ADMIN — KETOROLAC TROMETHAMINE 30 MG: 30 INJECTION, SOLUTION INTRAMUSCULAR; INTRAVENOUS at 21:15

## 2023-06-22 RX ADMIN — ONDANSETRON 4 MG: 2 INJECTION INTRAMUSCULAR; INTRAVENOUS at 08:17

## 2023-06-22 RX ADMIN — KETOROLAC TROMETHAMINE 30 MG: 30 INJECTION, SOLUTION INTRAMUSCULAR; INTRAVENOUS at 14:56

## 2023-06-22 RX ADMIN — BUPIVACAINE HYDROCHLORIDE IN DEXTROSE 13.5 MG: 7.5 INJECTION, SOLUTION SUBARACHNOID at 07:48

## 2023-06-22 RX ADMIN — DIPHENHYDRAMINE HYDROCHLORIDE 25 MG: 50 INJECTION, SOLUTION INTRAMUSCULAR; INTRAVENOUS at 18:58

## 2023-06-22 RX ADMIN — DEXAMETHASONE SODIUM PHOSPHATE 4 MG: 4 INJECTION, SOLUTION INTRAMUSCULAR; INTRAVENOUS at 08:17

## 2023-06-22 RX ADMIN — EPHEDRINE SULFATE 10 MG: 50 INJECTION INTRAVENOUS at 08:04

## 2023-06-22 ASSESSMENT — PAIN DESCRIPTION - LOCATION
LOCATION: ABDOMEN
LOCATION: ABDOMEN;INCISION

## 2023-06-22 ASSESSMENT — PAIN SCALES - GENERAL
PAINLEVEL_OUTOF10: 7
PAINLEVEL_OUTOF10: 3
PAINLEVEL_OUTOF10: 5
PAINLEVEL_OUTOF10: 2
PAINLEVEL_OUTOF10: 5

## 2023-06-22 ASSESSMENT — PAIN DESCRIPTION - DESCRIPTORS
DESCRIPTORS: ACHING
DESCRIPTORS: ACHING
DESCRIPTORS: CRAMPING;ACHING;SHARP
DESCRIPTORS: ACHING
DESCRIPTORS: ACHING;CRAMPING;DISCOMFORT;TENDER;SORE

## 2023-06-22 ASSESSMENT — PAIN - FUNCTIONAL ASSESSMENT
PAIN_FUNCTIONAL_ASSESSMENT: PREVENTS OR INTERFERES SOME ACTIVE ACTIVITIES AND ADLS
PAIN_FUNCTIONAL_ASSESSMENT: ACTIVITIES ARE NOT PREVENTED

## 2023-06-22 ASSESSMENT — PAIN DESCRIPTION - ONSET: ONSET: GRADUAL

## 2023-06-22 ASSESSMENT — PAIN DESCRIPTION - FREQUENCY: FREQUENCY: CONTINUOUS

## 2023-06-22 ASSESSMENT — PAIN DESCRIPTION - ORIENTATION
ORIENTATION: LOWER
ORIENTATION: MID;LOWER
ORIENTATION: LOWER;MID
ORIENTATION: LOWER

## 2023-06-22 ASSESSMENT — PAIN DESCRIPTION - PAIN TYPE: TYPE: SURGICAL PAIN

## 2023-06-22 NOTE — ANESTHESIA PRE PROCEDURE
Department of Anesthesiology  Preprocedure Note       Name:  Elgin Bauman   Age:  28 y.o.  :  1990                                          MRN:  [id number]         Date:  2023      Surgeon: Diogo Luciano):  Chito Rodriguez MD    Procedure: Procedure(s):   SECTION    Medications prior to admission:   Prior to Admission medications    Medication Sig Start Date End Date Taking? Authorizing Provider   cetirizine (ZYRTEC) 10 MG tablet Take 1 tablet by mouth daily 23   Historical Provider, MD   acetaminophen (TYLENOL) 325 MG tablet TAKE 2 TABLETS BY MOUTH EVERY 6 HOURS AS NEEDED FOR HEADACHE .  DO NOT EXCEED 6 PER 24 HOURS  Patient not taking: Reported on 2023 3/1/23   Historical Provider, MD   albuterol sulfate HFA (PROVENTIL;VENTOLIN;PROAIR) 108 (90 Base) MCG/ACT inhaler Inhale 2 puffs into the lungs  Patient not taking: Reported on 2023   Historical Provider, MD   Prenatal Vit-Fe Fumarate-FA (CLASSIC PRENATAL) 28-0.8 MG TABS Take 1 tablet by mouth daily 3/21/23   Historical Provider, MD   aspirin 81 MG EC tablet Take 1 tablet by mouth daily  Patient not taking: Reported on 2023    Historical Provider, MD   levothyroxine (SYNTHROID) 88 MCG tablet Take 1 tablet by mouth Daily    Historical Provider, MD       Current medications:    Current Facility-Administered Medications   Medication Dose Route Frequency Provider Last Rate Last Admin    lactated ringers IV soln infusion   IntraVENous Continuous Chito Rodriguez  mL/hr at 23 0854 New Bag at 23 0854    oxytocin (PITOCIN) 30 units in 500 mL infusion  87.3 ru-units/min IntraVENous Continuous PRN Chito Rodriguez MD   Stopped at 23    And    oxytocin (PITOCIN) 10 unit bolus from the bag  10 Units IntraVENous PRN Chito Rodriguez MD        ondansetron [facility]) injection 4 mg  4 mg IntraVENous Q6H PRN Chito Rodriguez MD           Allergies:

## 2023-06-22 NOTE — ANESTHESIA PROCEDURE NOTES
Spinal Block    Patient location during procedure: OR  End time: 6/22/2023 7:51 AM  Reason for block: primary anesthetic and at surgeon's request  Staffing  Performed: resident/CRNA   Resident/CRNA: MICAH Plascencia CRNA  Spinal Block  Patient position: sitting  Prep: ChloraPrep  Patient monitoring: cardiac monitor, continuous pulse ox and frequent blood pressure checks  Approach: midline  Location: L4/L5  Provider prep: mask and sterile gloves  Local infiltration: lidocaine  Needle  Needle type: pencil-tip   Needle gauge: 25 G  Needle length: 3.5 in  Needle insertion depth: 3 cm  Assessment  Sensory level: T4  Events: cerebrospinal fluid  Swirl obtained: Yes  CSF: clear  Attempts: 1  Hemodynamics: stable  Preanesthetic Checklist  Completed: patient identified, IV checked, site marked, risks and benefits discussed, surgical/procedural consents, equipment checked, pre-op evaluation, timeout performed, anesthesia consent given, oxygen available, monitors applied/VS acknowledged, fire risk safety assessment completed and verbalized and blood product R/B/A discussed and consented

## 2023-06-23 LAB
BASOPHILS ABSOLUTE: 0 K/CU MM
BASOPHILS RELATIVE PERCENT: 0.4 % (ref 0–1)
DIFFERENTIAL TYPE: ABNORMAL
EOSINOPHILS ABSOLUTE: 0.2 K/CU MM
EOSINOPHILS RELATIVE PERCENT: 2.1 % (ref 0–3)
HCT VFR BLD CALC: 34.9 % (ref 37–47)
HEMOGLOBIN: 11.4 GM/DL (ref 12.5–16)
IMMATURE NEUTROPHIL %: 0.4 % (ref 0–0.43)
LYMPHOCYTES ABSOLUTE: 1.8 K/CU MM
LYMPHOCYTES RELATIVE PERCENT: 25.3 % (ref 24–44)
MCH RBC QN AUTO: 28.9 PG (ref 27–31)
MCHC RBC AUTO-ENTMCNC: 32.7 % (ref 32–36)
MCV RBC AUTO: 88.4 FL (ref 78–100)
MONOCYTES ABSOLUTE: 0.5 K/CU MM
MONOCYTES RELATIVE PERCENT: 6.7 % (ref 0–4)
NUCLEATED RBC %: 0 %
PDW BLD-RTO: 14.5 % (ref 11.7–14.9)
PLATELET # BLD: 154 K/CU MM (ref 140–440)
PMV BLD AUTO: 11.5 FL (ref 7.5–11.1)
RBC # BLD: 3.95 M/CU MM (ref 4.2–5.4)
SEGMENTED NEUTROPHILS ABSOLUTE COUNT: 4.6 K/CU MM
SEGMENTED NEUTROPHILS RELATIVE PERCENT: 65.1 % (ref 36–66)
TOTAL IMMATURE NEUTOROPHIL: 0.03 K/CU MM
TOTAL NUCLEATED RBC: 0 K/CU MM
WBC # BLD: 7 K/CU MM (ref 4–10.5)

## 2023-06-23 PROCEDURE — 6360000002 HC RX W HCPCS

## 2023-06-23 PROCEDURE — 6370000000 HC RX 637 (ALT 250 FOR IP): Performed by: OBSTETRICS & GYNECOLOGY

## 2023-06-23 PROCEDURE — 94761 N-INVAS EAR/PLS OXIMETRY MLT: CPT

## 2023-06-23 PROCEDURE — 1220000000 HC SEMI PRIVATE OB R&B

## 2023-06-23 PROCEDURE — 36415 COLL VENOUS BLD VENIPUNCTURE: CPT

## 2023-06-23 PROCEDURE — 6370000000 HC RX 637 (ALT 250 FOR IP)

## 2023-06-23 PROCEDURE — 85025 COMPLETE CBC W/AUTO DIFF WBC: CPT

## 2023-06-23 RX ORDER — DIPHENHYDRAMINE HCL 25 MG
25 TABLET ORAL EVERY 6 HOURS PRN
Status: DISCONTINUED | OUTPATIENT
Start: 2023-06-23 | End: 2023-06-26 | Stop reason: HOSPADM

## 2023-06-23 RX ADMIN — DOCUSATE SODIUM 100 MG: 100 CAPSULE, LIQUID FILLED ORAL at 09:50

## 2023-06-23 RX ADMIN — ASPIRIN 81 MG CHEWABLE TABLET 81 MG: 81 TABLET CHEWABLE at 09:51

## 2023-06-23 RX ADMIN — DOCUSATE SODIUM 100 MG: 100 CAPSULE, LIQUID FILLED ORAL at 22:59

## 2023-06-23 RX ADMIN — ACETAMINOPHEN 1000 MG: 500 TABLET ORAL at 22:59

## 2023-06-23 RX ADMIN — IBUPROFEN 800 MG: 800 TABLET, FILM COATED ORAL at 09:51

## 2023-06-23 RX ADMIN — OXYCODONE HYDROCHLORIDE 10 MG: 5 TABLET ORAL at 12:10

## 2023-06-23 RX ADMIN — IBUPROFEN 800 MG: 800 TABLET, FILM COATED ORAL at 18:06

## 2023-06-23 RX ADMIN — OXYCODONE HYDROCHLORIDE 10 MG: 5 TABLET ORAL at 18:07

## 2023-06-23 RX ADMIN — DIPHENHYDRAMINE HYDROCHLORIDE 25 MG: 50 INJECTION, SOLUTION INTRAMUSCULAR; INTRAVENOUS at 00:57

## 2023-06-23 RX ADMIN — OXYCODONE HYDROCHLORIDE 10 MG: 5 TABLET ORAL at 22:59

## 2023-06-23 RX ADMIN — DIPHENHYDRAMINE HYDROCHLORIDE 25 MG: 50 INJECTION, SOLUTION INTRAMUSCULAR; INTRAVENOUS at 08:14

## 2023-06-23 RX ADMIN — DIPHENHYDRAMINE HYDROCHLORIDE 25 MG: 25 TABLET ORAL at 21:04

## 2023-06-23 RX ADMIN — ACETAMINOPHEN 1000 MG: 500 TABLET ORAL at 04:58

## 2023-06-23 RX ADMIN — ACETAMINOPHEN 1000 MG: 500 TABLET ORAL at 14:24

## 2023-06-23 RX ADMIN — OXYCODONE HYDROCHLORIDE 10 MG: 5 TABLET ORAL at 04:59

## 2023-06-23 RX ADMIN — LEVOTHYROXINE SODIUM 88 MCG: 0.09 TABLET ORAL at 08:37

## 2023-06-23 RX ADMIN — Medication 1 TABLET: at 09:50

## 2023-06-23 ASSESSMENT — PAIN DESCRIPTION - ORIENTATION
ORIENTATION: LOWER;MID
ORIENTATION: MID;LOWER
ORIENTATION: LOWER;MID

## 2023-06-23 ASSESSMENT — PAIN DESCRIPTION - DESCRIPTORS
DESCRIPTORS: CRAMPING
DESCRIPTORS: DISCOMFORT;CRAMPING
DESCRIPTORS: CRAMPING
DESCRIPTORS: ACHING;CRAMPING;DISCOMFORT;SORE;TENDER

## 2023-06-23 ASSESSMENT — PAIN SCALES - GENERAL
PAINLEVEL_OUTOF10: 7
PAINLEVEL_OUTOF10: 2
PAINLEVEL_OUTOF10: 7
PAINLEVEL_OUTOF10: 5
PAINLEVEL_OUTOF10: 4
PAINLEVEL_OUTOF10: 7

## 2023-06-23 ASSESSMENT — PAIN - FUNCTIONAL ASSESSMENT
PAIN_FUNCTIONAL_ASSESSMENT: ACTIVITIES ARE NOT PREVENTED
PAIN_FUNCTIONAL_ASSESSMENT: ACTIVITIES ARE NOT PREVENTED

## 2023-06-23 ASSESSMENT — PAIN DESCRIPTION - FREQUENCY: FREQUENCY: INTERMITTENT

## 2023-06-23 ASSESSMENT — PAIN DESCRIPTION - LOCATION
LOCATION: INCISION
LOCATION: ABDOMEN
LOCATION: ABDOMEN
LOCATION: ABDOMEN;INCISION
LOCATION: ABDOMEN
LOCATION: ABDOMEN

## 2023-06-23 ASSESSMENT — PAIN DESCRIPTION - PAIN TYPE: TYPE: ACUTE PAIN;SURGICAL PAIN

## 2023-06-23 ASSESSMENT — PAIN DESCRIPTION - ONSET: ONSET: GRADUAL

## 2023-06-24 PROCEDURE — 6370000000 HC RX 637 (ALT 250 FOR IP)

## 2023-06-24 PROCEDURE — 1220000000 HC SEMI PRIVATE OB R&B

## 2023-06-24 PROCEDURE — 94761 N-INVAS EAR/PLS OXIMETRY MLT: CPT

## 2023-06-24 RX ADMIN — OXYCODONE HYDROCHLORIDE 10 MG: 5 TABLET ORAL at 14:05

## 2023-06-24 RX ADMIN — Medication 1 TABLET: at 22:14

## 2023-06-24 RX ADMIN — ACETAMINOPHEN 1000 MG: 500 TABLET ORAL at 22:14

## 2023-06-24 RX ADMIN — IBUPROFEN 800 MG: 800 TABLET, FILM COATED ORAL at 22:14

## 2023-06-24 RX ADMIN — OXYCODONE HYDROCHLORIDE 10 MG: 5 TABLET ORAL at 23:31

## 2023-06-24 RX ADMIN — Medication 1 TABLET: at 09:30

## 2023-06-24 RX ADMIN — IBUPROFEN 800 MG: 800 TABLET, FILM COATED ORAL at 02:38

## 2023-06-24 RX ADMIN — DOCUSATE SODIUM 100 MG: 100 CAPSULE, LIQUID FILLED ORAL at 09:31

## 2023-06-24 RX ADMIN — ACETAMINOPHEN 1000 MG: 500 TABLET ORAL at 14:04

## 2023-06-24 RX ADMIN — ASPIRIN 81 MG CHEWABLE TABLET 81 MG: 81 TABLET CHEWABLE at 09:30

## 2023-06-24 RX ADMIN — IBUPROFEN 800 MG: 800 TABLET, FILM COATED ORAL at 14:05

## 2023-06-24 RX ADMIN — OXYCODONE HYDROCHLORIDE 10 MG: 5 TABLET ORAL at 09:31

## 2023-06-24 RX ADMIN — LEVOTHYROXINE SODIUM 88 MCG: 0.09 TABLET ORAL at 07:21

## 2023-06-24 RX ADMIN — OXYCODONE HYDROCHLORIDE 10 MG: 5 TABLET ORAL at 02:39

## 2023-06-24 RX ADMIN — DOCUSATE SODIUM 100 MG: 100 CAPSULE, LIQUID FILLED ORAL at 22:14

## 2023-06-24 ASSESSMENT — PAIN SCALES - GENERAL
PAINLEVEL_OUTOF10: 0
PAINLEVEL_OUTOF10: 7
PAINLEVEL_OUTOF10: 8
PAINLEVEL_OUTOF10: 8
PAINLEVEL_OUTOF10: 7

## 2023-06-24 ASSESSMENT — PAIN DESCRIPTION - ORIENTATION
ORIENTATION: LOWER;MID
ORIENTATION: RIGHT
ORIENTATION: LOWER
ORIENTATION: LOWER

## 2023-06-24 ASSESSMENT — PAIN DESCRIPTION - LOCATION
LOCATION: ABDOMEN;INCISION
LOCATION: ABDOMEN;INCISION
LOCATION: INCISION
LOCATION: ABDOMEN;INCISION

## 2023-06-24 ASSESSMENT — PAIN - FUNCTIONAL ASSESSMENT
PAIN_FUNCTIONAL_ASSESSMENT: ACTIVITIES ARE NOT PREVENTED

## 2023-06-24 ASSESSMENT — PAIN DESCRIPTION - FREQUENCY: FREQUENCY: INTERMITTENT

## 2023-06-24 ASSESSMENT — PAIN DESCRIPTION - DESCRIPTORS
DESCRIPTORS: ACHING;BURNING;SHARP
DESCRIPTORS: ACHING;CRAMPING;TENDER;SORE
DESCRIPTORS: CRAMPING;SHARP;DISCOMFORT
DESCRIPTORS: SHARP;BURNING

## 2023-06-24 ASSESSMENT — PAIN DESCRIPTION - PAIN TYPE: TYPE: ACUTE PAIN

## 2023-06-24 ASSESSMENT — PAIN DESCRIPTION - ONSET: ONSET: GRADUAL

## 2023-06-25 PROCEDURE — 6370000000 HC RX 637 (ALT 250 FOR IP)

## 2023-06-25 PROCEDURE — 1220000000 HC SEMI PRIVATE OB R&B

## 2023-06-25 RX ADMIN — ACETAMINOPHEN 1000 MG: 500 TABLET ORAL at 07:33

## 2023-06-25 RX ADMIN — OXYCODONE HYDROCHLORIDE 10 MG: 5 TABLET ORAL at 07:38

## 2023-06-25 RX ADMIN — DOCUSATE SODIUM 100 MG: 100 CAPSULE, LIQUID FILLED ORAL at 21:08

## 2023-06-25 RX ADMIN — OXYCODONE HYDROCHLORIDE 10 MG: 5 TABLET ORAL at 17:53

## 2023-06-25 RX ADMIN — DOCUSATE SODIUM 100 MG: 100 CAPSULE, LIQUID FILLED ORAL at 08:48

## 2023-06-25 RX ADMIN — IBUPROFEN 800 MG: 800 TABLET, FILM COATED ORAL at 17:52

## 2023-06-25 RX ADMIN — LEVOTHYROXINE SODIUM 88 MCG: 0.09 TABLET ORAL at 07:35

## 2023-06-25 RX ADMIN — Medication 1 TABLET: at 21:08

## 2023-06-25 RX ADMIN — ACETAMINOPHEN 1000 MG: 500 TABLET ORAL at 17:50

## 2023-06-25 RX ADMIN — ASPIRIN 81 MG CHEWABLE TABLET 81 MG: 81 TABLET CHEWABLE at 08:48

## 2023-06-25 RX ADMIN — IBUPROFEN 800 MG: 800 TABLET, FILM COATED ORAL at 07:35

## 2023-06-25 ASSESSMENT — PAIN SCALES - GENERAL
PAINLEVEL_OUTOF10: 8
PAINLEVEL_OUTOF10: 7
PAINLEVEL_OUTOF10: 8

## 2023-06-25 ASSESSMENT — PAIN DESCRIPTION - DESCRIPTORS
DESCRIPTORS: ACHING;BURNING;CRAMPING
DESCRIPTORS: ACHING;BURNING
DESCRIPTORS: ACHING;BURNING;CRAMPING

## 2023-06-25 ASSESSMENT — PAIN DESCRIPTION - ORIENTATION
ORIENTATION: LOWER;RIGHT

## 2023-06-25 ASSESSMENT — PAIN - FUNCTIONAL ASSESSMENT
PAIN_FUNCTIONAL_ASSESSMENT: ACTIVITIES ARE NOT PREVENTED

## 2023-06-25 ASSESSMENT — PAIN DESCRIPTION - LOCATION
LOCATION: ABDOMEN;INCISION
LOCATION: ABDOMEN
LOCATION: ABDOMEN;INCISION

## 2023-06-26 VITALS
OXYGEN SATURATION: 97 % | TEMPERATURE: 98.3 F | SYSTOLIC BLOOD PRESSURE: 123 MMHG | RESPIRATION RATE: 16 BRPM | HEART RATE: 87 BPM | DIASTOLIC BLOOD PRESSURE: 61 MMHG

## 2023-06-26 PROCEDURE — 6370000000 HC RX 637 (ALT 250 FOR IP)

## 2023-06-26 RX ORDER — IBUPROFEN 800 MG/1
800 TABLET ORAL EVERY 8 HOURS SCHEDULED
Qty: 40 TABLET | Refills: 1 | Status: SHIPPED | OUTPATIENT
Start: 2023-06-26

## 2023-06-26 RX ORDER — PSEUDOEPHEDRINE HCL 30 MG
100 TABLET ORAL 2 TIMES DAILY
Qty: 30 CAPSULE | Refills: 1 | Status: SHIPPED | OUTPATIENT
Start: 2023-06-26

## 2023-06-26 RX ORDER — OXYCODONE HYDROCHLORIDE 5 MG/1
5 TABLET ORAL EVERY 6 HOURS PRN
Qty: 12 TABLET | Refills: 0 | Status: SHIPPED | OUTPATIENT
Start: 2023-06-26 | End: 2023-06-29

## 2023-06-26 RX ADMIN — OXYCODONE HYDROCHLORIDE 10 MG: 5 TABLET ORAL at 00:06

## 2023-06-26 RX ADMIN — ASPIRIN 81 MG CHEWABLE TABLET 81 MG: 81 TABLET CHEWABLE at 09:15

## 2023-06-26 RX ADMIN — ACETAMINOPHEN 1000 MG: 500 TABLET ORAL at 02:35

## 2023-06-26 RX ADMIN — IBUPROFEN 800 MG: 800 TABLET, FILM COATED ORAL at 05:54

## 2023-06-26 RX ADMIN — DOCUSATE SODIUM 100 MG: 100 CAPSULE, LIQUID FILLED ORAL at 09:16

## 2023-06-26 RX ADMIN — Medication 1 TABLET: at 09:15

## 2023-06-26 RX ADMIN — LEVOTHYROXINE SODIUM 88 MCG: 0.09 TABLET ORAL at 09:16

## 2023-06-26 ASSESSMENT — PAIN SCALES - GENERAL
PAINLEVEL_OUTOF10: 4
PAINLEVEL_OUTOF10: 4
PAINLEVEL_OUTOF10: 7
PAINLEVEL_OUTOF10: 0
PAINLEVEL_OUTOF10: 0

## 2023-06-26 ASSESSMENT — PAIN DESCRIPTION - DESCRIPTORS
DESCRIPTORS: SORE
DESCRIPTORS: CRAMPING;SORE

## 2023-06-26 ASSESSMENT — PAIN DESCRIPTION - ONSET: ONSET: ON-GOING

## 2023-06-26 ASSESSMENT — PAIN DESCRIPTION - FREQUENCY: FREQUENCY: CONTINUOUS

## 2023-06-26 ASSESSMENT — PAIN DESCRIPTION - PAIN TYPE: TYPE: SURGICAL PAIN

## 2023-06-26 ASSESSMENT — PAIN DESCRIPTION - LOCATION: LOCATION: ABDOMEN

## 2023-06-26 ASSESSMENT — PAIN - FUNCTIONAL ASSESSMENT: PAIN_FUNCTIONAL_ASSESSMENT: ACTIVITIES ARE NOT PREVENTED

## 2023-07-12 ENCOUNTER — HOSPITAL ENCOUNTER (OUTPATIENT)
Dept: WOUND CARE | Age: 33
Discharge: HOME | End: 2023-07-12
Payer: COMMERCIAL

## 2023-07-12 VITALS
DIASTOLIC BLOOD PRESSURE: 66 MMHG | SYSTOLIC BLOOD PRESSURE: 126 MMHG | RESPIRATION RATE: 16 BRPM | HEART RATE: 66 BPM | TEMPERATURE: 97.4 F

## 2023-07-12 DIAGNOSIS — T81.89XA SURGICAL WOUND, NON HEALING, INITIAL ENCOUNTER: Primary | ICD-10-CM

## 2023-07-12 DIAGNOSIS — L92.9 HYPERGRANULATION: ICD-10-CM

## 2023-07-12 DIAGNOSIS — T81.31XA DEHISCENCE OF OPERATIVE WOUND, INITIAL ENCOUNTER: ICD-10-CM

## 2023-07-12 PROCEDURE — 11042 DBRDMT SUBQ TIS 1ST 20SQCM/<: CPT

## 2023-07-12 PROCEDURE — 99213 OFFICE O/P EST LOW 20 MIN: CPT

## 2023-07-12 RX ORDER — CEPHALEXIN 500 MG/1
500 CAPSULE ORAL 3 TIMES DAILY
COMMUNITY

## 2023-07-12 ASSESSMENT — PAIN SCALES - GENERAL: PAINLEVEL_OUTOF10: 0

## 2023-07-12 NOTE — DISCHARGE INSTRUCTIONS
PHYSICIAN ORDERS AND DISCHARGE INSTRUCTIONS    NOTE: Upon discharge from the  [facility], you will receive a patient experience survey. We would be grateful if you would take the time to fill this survey out. Wound care order history:     MERON's   Right       Left    Date:   Cultures:     Grafts:     Antibiotics:        Continuing wound care orders and information:                Residence:                Continue home health care with:    Your wound-care supplies will be provided by: Wound cleansing:     Do not scrub or use excessive force. Wash hands with soap and water before and after dressing changes. Prior to applying a clean dressing, cleanse wound with normal saline, wound cleanser, or mild soap and water. Ask the physician or nurse before getting the wound(s) wet in a shower. General Wound management:   Keep weight off wounds and reposition every 2 hours. Avoid standing for long periods of time. Apply wraps/stockings in AM and remove at bedtime. If swelling is present, elevate legs to the level of the heart or above for 30 minutes 4-5 times a day and/or when sitting. When taking antibiotics take entire prescription as ordered by physician do not stop taking until medicine is all gone. Increase protein intake to promote healing. Orders for this week: 23    Rx:    Lower Abdominal Wound () - Wash with soap and water, pat dry. Apply Anasept and Stimulen to the wound bed. Cover with a silicone border or 4x4 gauze. Change daily. Follow up with Dr Matt Chavez in 1 week(s) in the wound care center. Call [phone] for any questions or concerns.   Date__________   Time____________
(3) no apparent problem

## 2023-07-12 NOTE — PROGRESS NOTES
Wound Care Center Progress Note and Chemical [employer]      Smita Sorensen  AGE: 28 y.o. GENDER: female  : 1990  EPISODE DATE:  2023     Subjective:     Chief Complaint   Patient presents with    Wound Check     stomach         HISTORY of PRESENT ILLNESS      Smita Sorensen is a 28 y.o. female who presents today for wound evaluation of Acute non-healing surgical ulcer(s) of lower abdomen from . The ulcer is of mild severity. The underlying cause of the wound is non-healing surgical.  First visit today. 3 weeks postop  Wound Pain Timing/Severity: mild  Quality of pain: dull, aching  Severity of pain:  2 / 10   Modifying Factors: shear force and obesity  Associated Signs/Symptoms: drainage          PAST MEDICAL HISTORY        Diagnosis Date    Asthma     Bleeding postpartum     post partum hemmhorage    Ganglion and cyst of synovium, tendon and bursa 2012    Gestational diabetes     Hashimoto's disease     Hypothyroidism        PAST SURGICAL HISTORY    Past Surgical History:   Procedure Laterality Date     SECTION N/A 3/9/2019     SECTION performed by Paolo Villalobos MD at [facility] [id number] [address] N/A 2023     SECTION performed by Paolo Villalobos MD at [facility] OR    CYST REMOVAL  12    right hand    [place]  2008    right lateral collateral ligament    FOREARM SURGERY  2007    right radial release    HAND SURGERY  [id number]    rt hand ganglion cyst removal    HARDWARE REMOVAL      right elbow    SHOULDER ARTHROSCOPY  2006    right repaired labrium    TOE SURGERY      right great toe bone spur shaved off.        FAMILY HISTORY    Family History   Problem Relation Age of Onset    Diabetes Mother     Other Mother         Thyroid    Allergies Maternal Grandmother     Tuberculosis Maternal Grandmother     Cancer Maternal Grandfather        SOCIAL HISTORY    Social History     Tobacco

## 2023-07-19 ENCOUNTER — HOSPITAL ENCOUNTER (OUTPATIENT)
Dept: WOUND CARE | Age: 33
Discharge: HOME | End: 2023-07-19
Payer: COMMERCIAL

## 2023-07-19 VITALS
HEART RATE: 65 BPM | TEMPERATURE: 98 F | RESPIRATION RATE: 20 BRPM | SYSTOLIC BLOOD PRESSURE: 110 MMHG | DIASTOLIC BLOOD PRESSURE: 64 MMHG

## 2023-07-19 DIAGNOSIS — T81.89XA SURGICAL WOUND, NON HEALING, INITIAL ENCOUNTER: ICD-10-CM

## 2023-07-19 DIAGNOSIS — T81.31XA DEHISCENCE OF OPERATIVE WOUND, INITIAL ENCOUNTER: ICD-10-CM

## 2023-07-19 DIAGNOSIS — L92.9 HYPERGRANULATION: Primary | ICD-10-CM

## 2023-07-19 PROCEDURE — 17250 CHEM CAUT OF GRANLTJ TISSUE: CPT

## 2023-07-19 RX ORDER — LIDOCAINE 40 MG/G
CREAM TOPICAL ONCE
OUTPATIENT
Start: 2023-07-19 | End: 2023-07-19

## 2023-07-19 RX ORDER — LIDOCAINE HYDROCHLORIDE 20 MG/ML
JELLY TOPICAL ONCE
OUTPATIENT
Start: 2023-07-19 | End: 2023-07-19

## 2023-07-19 RX ORDER — LIDOCAINE 50 MG/G
OINTMENT TOPICAL ONCE
OUTPATIENT
Start: 2023-07-19 | End: 2023-07-19

## 2023-07-19 RX ORDER — CLOBETASOL PROPIONATE 0.5 MG/G
OINTMENT TOPICAL ONCE
OUTPATIENT
Start: 2023-07-19 | End: 2023-07-19

## 2023-07-19 RX ORDER — IBUPROFEN 200 MG
TABLET ORAL ONCE
OUTPATIENT
Start: 2023-07-19 | End: 2023-07-19

## 2023-07-19 RX ORDER — BETAMETHASONE DIPROPIONATE 0.05 %
OINTMENT (GRAM) TOPICAL ONCE
OUTPATIENT
Start: 2023-07-19 | End: 2023-07-19

## 2023-07-19 RX ORDER — LIDOCAINE HYDROCHLORIDE 40 MG/ML
SOLUTION TOPICAL ONCE
OUTPATIENT
Start: 2023-07-19 | End: 2023-07-19

## 2023-07-19 RX ORDER — BACITRACIN ZINC AND POLYMYXIN B SULFATE 500; 1000 [USP'U]/G; [USP'U]/G
OINTMENT TOPICAL ONCE
OUTPATIENT
Start: 2023-07-19 | End: 2023-07-19

## 2023-07-19 RX ORDER — GENTAMICIN SULFATE 1 MG/G
OINTMENT TOPICAL ONCE
OUTPATIENT
Start: 2023-07-19 | End: 2023-07-19

## 2023-07-19 RX ORDER — GINSENG 100 MG
CAPSULE ORAL ONCE
OUTPATIENT
Start: 2023-07-19 | End: 2023-07-19

## 2023-07-19 RX ORDER — SODIUM CHLOR/HYPOCHLOROUS ACID 0.033 %
SOLUTION, IRRIGATION IRRIGATION ONCE
OUTPATIENT
Start: 2023-07-19 | End: 2023-07-19

## 2023-07-19 NOTE — DISCHARGE INSTRUCTIONS
PHYSICIAN ORDERS AND DISCHARGE INSTRUCTIONS     NOTE: Upon discharge from the  Medical [facility], you will receive a patient experience survey. We would be grateful if you would take the time to fill this survey out. Wound care order history:                 MERON's   Right       Left               Date:              Cultures:                Grafts:                Antibiotics:           Continuing wound care orders and information:                 Residence:                Continue home health care with:               Your wound-care supplies will be provided by: Wound cleansing:               Do not scrub or use excessive force. Wash hands with soap and water before and after dressing changes. Prior to applying a clean dressing, cleanse wound with normal saline, wound cleanser, or mild soap and water. Ask the physician or nurse before getting the wound(s) wet in a shower. General Wound management:              Keep weight off wounds and reposition every 2 hours. Avoid standing for long periods of time. Apply wraps/stockings in AM and remove at bedtime. If swelling is present, elevate legs to the level of the heart or above for 30 minutes 4-5 times a day and/or when sitting. When taking antibiotics take entire prescription as ordered by physician do not stop taking until medicine is all gone. Increase protein intake to promote healing. Orders for this week: 23     Rx:     Lower Abdominal Wound () - Wash with soap and water, pat dry. Apply Stimulen to the wound bed. Cover with a silicone border or 4x4 gauze. Change daily. Follow up with Dr Deidre Ureña in 2 week(s) in the wound care center. Call [phone] for any questions or concerns.   Date__________   Time___________

## 2023-07-19 NOTE — PROGRESS NOTES
Wound Care Center Progress Note and Chemical Cautery      Kati Montoya  AGE: 28 y.o. GENDER: female  : 1990  EPISODE DATE:  2023     Subjective:     Chief Complaint   Patient presents with    Wound Check     stomach         HISTORY of PRESENT ILLNESS      Kati Montoya is a 28 y.o. female who presents today for wound evaluation of Chronic non-healing surgical ulcer(s) of lower abdominal wall. The ulcer is of mild severity. The underlying cause of the wound is non-healing surgical.  Slightly improved. Wound Pain Timing/Severity: mild  Quality of pain: dull, aching  Severity of pain:  2 / 10   Modifying Factors: edema  Associated Signs/Symptoms: drainage          PAST MEDICAL HISTORY        Diagnosis Date    Asthma     Bleeding postpartum     post partum hemmhorage    Ganglion and cyst of synovium, tendon and bursa 2012    Gestational diabetes     Hashimoto's disease     Hypothyroidism        PAST SURGICAL HISTORY    Past Surgical History:   Procedure Laterality Date     SECTION N/A 3/9/2019     SECTION performed by Narciso Arredondo MD at [facility] L&D [address] [address] N/A 2023     SECTION performed by Narciso Arredondo MD at [facility] L&D OR    CYST REMOVAL  12    right hand    [place]  2008    right lateral collateral ligament    FOREARM SURGERY  2007    right radial release    HAND SURGERY  [id number]    rt hand ganglion cyst removal    HARDWARE REMOVAL      right elbow    SHOULDER ARTHROSCOPY  2006    right repaired labrium    TOE SURGERY      right great toe bone spur shaved off.        FAMILY HISTORY    Family History   Problem Relation Age of Onset    Diabetes Mother     Other Mother         Thyroid    Allergies Maternal Grandmother     Tuberculosis Maternal Grandmother     Cancer Maternal Grandfather        SOCIAL HISTORY    Social History     Tobacco Use    Smoking status: Never

## 2023-08-02 ENCOUNTER — HOSPITAL ENCOUNTER (OUTPATIENT)
Dept: WOUND CARE | Age: 33
Discharge: HOME | End: 2023-08-02
Payer: COMMERCIAL

## 2023-08-02 VITALS
TEMPERATURE: 97.4 F | HEART RATE: 54 BPM | DIASTOLIC BLOOD PRESSURE: 72 MMHG | SYSTOLIC BLOOD PRESSURE: 108 MMHG | RESPIRATION RATE: 18 BRPM

## 2023-08-02 DIAGNOSIS — L92.9 HYPERGRANULATION: ICD-10-CM

## 2023-08-02 DIAGNOSIS — T81.89XA SURGICAL WOUND, NON HEALING, INITIAL ENCOUNTER: Primary | ICD-10-CM

## 2023-08-02 PROCEDURE — 6370000000 HC RX 637 (ALT 250 FOR IP): Performed by: SURGERY

## 2023-08-02 PROCEDURE — 17250 CHEM CAUT OF GRANLTJ TISSUE: CPT

## 2023-08-02 RX ORDER — SODIUM CHLOR/HYPOCHLOROUS ACID 0.033 %
SOLUTION, IRRIGATION IRRIGATION ONCE
OUTPATIENT
Start: 2023-08-02 | End: 2023-08-02

## 2023-08-02 RX ORDER — CLOBETASOL PROPIONATE 0.5 MG/G
OINTMENT TOPICAL ONCE
OUTPATIENT
Start: 2023-08-02 | End: 2023-08-02

## 2023-08-02 RX ORDER — LIDOCAINE HYDROCHLORIDE 20 MG/ML
JELLY TOPICAL ONCE
OUTPATIENT
Start: 2023-08-02 | End: 2023-08-02

## 2023-08-02 RX ORDER — GENTAMICIN SULFATE 1 MG/G
OINTMENT TOPICAL ONCE
OUTPATIENT
Start: 2023-08-02 | End: 2023-08-02

## 2023-08-02 RX ORDER — LIDOCAINE 40 MG/G
CREAM TOPICAL ONCE
OUTPATIENT
Start: 2023-08-02 | End: 2023-08-02

## 2023-08-02 RX ORDER — BACITRACIN ZINC AND POLYMYXIN B SULFATE 500; 1000 [USP'U]/G; [USP'U]/G
OINTMENT TOPICAL ONCE
OUTPATIENT
Start: 2023-08-02 | End: 2023-08-02

## 2023-08-02 RX ORDER — LIDOCAINE HYDROCHLORIDE 40 MG/ML
SOLUTION TOPICAL ONCE
OUTPATIENT
Start: 2023-08-02 | End: 2023-08-02

## 2023-08-02 RX ORDER — NYSTATIN 100000 [USP'U]/G
POWDER TOPICAL
Qty: 15 G | Refills: 1 | Status: SHIPPED | OUTPATIENT
Start: 2023-08-02

## 2023-08-02 RX ORDER — IBUPROFEN 200 MG
TABLET ORAL ONCE
OUTPATIENT
Start: 2023-08-02 | End: 2023-08-02

## 2023-08-02 RX ORDER — BETAMETHASONE DIPROPIONATE 0.05 %
OINTMENT (GRAM) TOPICAL ONCE
OUTPATIENT
Start: 2023-08-02 | End: 2023-08-02

## 2023-08-02 RX ORDER — LIDOCAINE 50 MG/G
OINTMENT TOPICAL ONCE
OUTPATIENT
Start: 2023-08-02 | End: 2023-08-02

## 2023-08-02 RX ORDER — GINSENG 100 MG
CAPSULE ORAL ONCE
OUTPATIENT
Start: 2023-08-02 | End: 2023-08-02

## 2023-08-02 RX ADMIN — SILVER NITRATE APPLICATORS 1 EACH: 25; 75 STICK TOPICAL at 12:03

## 2023-08-02 NOTE — DISCHARGE INSTRUCTIONS
PHYSICIAN ORDERS AND DISCHARGE INSTRUCTIONS     NOTE: Upon discharge from the  Medical [facility], you will receive a patient experience survey. We would be grateful if you would take the time to fill this survey out. Wound care order history:                 MERON's   Right       Left               Date:              Cultures:                Grafts:                Antibiotics:           Continuing wound care orders and information:                 Residence:                Continue home health care with:               Your wound-care supplies will be provided by: Wound cleansing:               Do not scrub or use excessive force. Wash hands with soap and water before and after dressing changes. Prior to applying a clean dressing, cleanse wound with normal saline, wound cleanser, or mild soap and water. Ask the physician or nurse before getting the wound(s) wet in a shower. General Wound management:              Keep weight off wounds and reposition every 2 hours. Avoid standing for long periods of time. Apply wraps/stockings in AM and remove at bedtime. If swelling is present, elevate legs to the level of the heart or above for 30 minutes 4-5 times a day and/or when sitting. When taking antibiotics take entire prescription as ordered by physician do not stop taking until medicine is all gone. Increase protein intake to promote healing. Orders for this week: 23     Rx:     Lower Abdominal Wound () - Wash with soap and water, pat dry. Apply Stimulen to the wound bed. Cover with a silicone border or 4x4 gauze today in clinic. At home - once Nystatin powder is available, start applying 1-2 times daily. No dressing necessary. Follow up with Dr Dora Valdovinos in 2 week(s) in the wound care center. Call [phone] for any questions or concerns.   Date__________   Time___________

## 2023-08-02 NOTE — PROGRESS NOTES
Wound Care Center Progress Note and Chemical Cautery      Rafi Sesay  AGE: 28 y.o. GENDER: female  : 1990  EPISODE DATE:  2023     Subjective:     Chief Complaint   Patient presents with    Wound Check         HISTORY of PRESENT ILLNESS      Rafi Sesay is a 28 y.o. female who presents today for wound evaluation of Chronic non-healing surgical ulcer(s) of lower abdominal wall from C section. The ulcer is of mild severity. The underlying cause of the wound is nonhealing surgical.  Unchanged. Wound Pain Timing/Severity: mild  Quality of pain: dull  Severity of pain:  2 / 10   Modifying Factors: shear force  Associated Signs/Symptoms: drainage          PAST MEDICAL HISTORY        Diagnosis Date    Asthma     Bleeding postpartum     post partum hemmhorage    Ganglion and cyst of synovium, tendon and bursa 2012    Gestational diabetes     Hashimoto's disease     Hypothyroidism        PAST SURGICAL HISTORY    Past Surgical History:   Procedure Laterality Date     SECTION N/A 3/9/2019     SECTION performed by Meño Hicks MD at [facility] L&D [address] [address] N/A 2023     SECTION performed by Meño Hicks MD at [facility] L&D OR    CYST REMOVAL  12    right hand    [place]  2008    right lateral collateral ligament    FOREARM SURGERY  2007    right radial release    HAND SURGERY  [id number]    rt hand ganglion cyst removal    HARDWARE REMOVAL  2009    right elbow    SHOULDER ARTHROSCOPY  2006    right repaired labrium    TOE SURGERY      right great toe bone spur shaved off.        FAMILY HISTORY    Family History   Problem Relation Age of Onset    Diabetes Mother     Other Mother         Thyroid    Allergies Maternal Grandmother     Tuberculosis Maternal Grandmother     Cancer Maternal Grandfather        SOCIAL HISTORY    Social History     Tobacco Use    Smoking status: Never    Smokeless

## 2023-08-16 ENCOUNTER — HOSPITAL ENCOUNTER (OUTPATIENT)
Dept: WOUND CARE | Age: 33
Discharge: HOME | End: 2023-08-16
Payer: COMMERCIAL

## 2023-08-16 VITALS
TEMPERATURE: 97.1 F | RESPIRATION RATE: 18 BRPM | DIASTOLIC BLOOD PRESSURE: 68 MMHG | HEART RATE: 65 BPM | SYSTOLIC BLOOD PRESSURE: 110 MMHG

## 2023-08-16 DIAGNOSIS — T81.89XA SURGICAL WOUND, NON HEALING, INITIAL ENCOUNTER: Primary | ICD-10-CM

## 2023-08-16 PROCEDURE — 11042 DBRDMT SUBQ TIS 1ST 20SQCM/<: CPT

## 2023-08-16 RX ORDER — IBUPROFEN 200 MG
TABLET ORAL ONCE
OUTPATIENT
Start: 2023-08-16 | End: 2023-08-16

## 2023-08-16 RX ORDER — GENTAMICIN SULFATE 1 MG/G
OINTMENT TOPICAL ONCE
OUTPATIENT
Start: 2023-08-16 | End: 2023-08-16

## 2023-08-16 RX ORDER — LIDOCAINE HYDROCHLORIDE 20 MG/ML
JELLY TOPICAL ONCE
OUTPATIENT
Start: 2023-08-16 | End: 2023-08-16

## 2023-08-16 RX ORDER — LIDOCAINE 50 MG/G
OINTMENT TOPICAL ONCE
OUTPATIENT
Start: 2023-08-16 | End: 2023-08-16

## 2023-08-16 RX ORDER — BETAMETHASONE DIPROPIONATE 0.05 %
OINTMENT (GRAM) TOPICAL ONCE
OUTPATIENT
Start: 2023-08-16 | End: 2023-08-16

## 2023-08-16 RX ORDER — CLOBETASOL PROPIONATE 0.5 MG/G
OINTMENT TOPICAL ONCE
OUTPATIENT
Start: 2023-08-16 | End: 2023-08-16

## 2023-08-16 RX ORDER — GINSENG 100 MG
CAPSULE ORAL ONCE
OUTPATIENT
Start: 2023-08-16 | End: 2023-08-16

## 2023-08-16 RX ORDER — SODIUM CHLOR/HYPOCHLOROUS ACID 0.033 %
SOLUTION, IRRIGATION IRRIGATION ONCE
OUTPATIENT
Start: 2023-08-16 | End: 2023-08-16

## 2023-08-16 RX ORDER — BACITRACIN ZINC AND POLYMYXIN B SULFATE 500; 1000 [USP'U]/G; [USP'U]/G
OINTMENT TOPICAL ONCE
OUTPATIENT
Start: 2023-08-16 | End: 2023-08-16

## 2023-08-16 RX ORDER — LIDOCAINE HYDROCHLORIDE 40 MG/ML
SOLUTION TOPICAL ONCE
OUTPATIENT
Start: 2023-08-16 | End: 2023-08-16

## 2023-08-16 RX ORDER — LIDOCAINE 40 MG/G
CREAM TOPICAL ONCE
OUTPATIENT
Start: 2023-08-16 | End: 2023-08-16

## 2023-08-16 ASSESSMENT — PAIN SCALES - GENERAL: PAINLEVEL_OUTOF10: 0

## 2023-08-16 NOTE — DISCHARGE INSTRUCTIONS
PHYSICIAN ORDERS AND DISCHARGE INSTRUCTIONS     NOTE: Upon discharge from the  Medical [facility], you will receive a patient experience survey. We would be grateful if you would take the time to fill this survey out. Wound care order history:                 MERON's   Right       Left               Date:              Cultures:                Grafts:                Antibiotics:           Continuing wound care orders and information:                 Residence:                Continue home health care with:               Your wound-care supplies will be provided by: Wound cleansing:               Do not scrub or use excessive force. Wash hands with soap and water before and after dressing changes. Prior to applying a clean dressing, cleanse wound with normal saline, wound cleanser, or mild soap and water. Ask the physician or nurse before getting the wound(s) wet in a shower. General Wound management:              Keep weight off wounds and reposition every 2 hours. Avoid standing for long periods of time. Apply wraps/stockings in AM and remove at bedtime. If swelling is present, elevate legs to the level of the heart or above for 30 minutes 4-5 times a day and/or when sitting. When taking antibiotics take entire prescription as ordered by physician do not stop taking until medicine is all gone. Increase protein intake to promote healing. Orders for this week: 23     Rx:     Lower Abdominal Wound () - Wash with soap and water, pat dry. Apply Stimulen to the wound bed. Cover with a silicone border or 4x4 gauze today in clinic. At home - once Nystatin powder is available, start applying 1-2 times daily. No dressing necessary. Follow up with Dr Matt Chavez in 2 week(s) in the wound care center. Call [phone] for any questions or concerns.   Date__________   Time___________

## 2023-08-16 NOTE — PROGRESS NOTES
Debrided:  slough and exudate    Pre Debridement Measurements:  Are located in the Wound Documentation Flow Sheet    All active wounds listed below with today's date are evaluated  Wound(s) debrided this date include # : 1     Post  Debridement Measurements:  Wound 23 Abdomen #1  cluster (Active)   Wound Image   23 1116   Wound Etiology Non-Healing Surgical 23 1049   Dressing Status New dressing applied 23 1128   Wound Cleansed Wound cleanser 23 1049   Offloading for Diabetic Foot Ulcers Offloading not ordered 23 1049   Wound Length (cm) 0.2 cm 23 1049   Wound Width (cm) 0.6 cm 23 1049   Wound Depth (cm) 0.1 cm 23 1049   Wound Surface Area (cm^2) 0.12 cm^2 23 1049   Change in Wound Size % (l*w) 93.33 23 1049   Wound Volume (cm^3) 0.012 cm^3 23 1049   Wound Healing % 93 23 1049   Post-Procedure Length (cm) 0.2 cm 23 1141   Post-Procedure Width (cm) 1.6 cm 23 1141   Post-Procedure Depth (cm) 0.1 cm 23 1141   Post-Procedure Surface Area (cm^2) 0.32 cm^2 23 1141   Post-Procedure Volume (cm^3) 0.032 cm^3 23 1141   Distance Tunneling (cm) 0 cm 23 1049   Tunneling Position ___ O'Clock 0 23 1049   Undermining Starts ___ O'Clock 0 23 1049   Undermining Ends___ O'Clock 0 23 1049   Undermining Maxium Distance (cm) 0 23 1049   Wound Assessment Pink/red 23 1049   Drainage Amount Small (< 25%) 23 1049   Drainage Description Serosanguinous 23 1049   Odor None 23 1049   Jennie-wound Assessment Intact 23 1049   Margins Defined edges 23 1049   Wound Thickness Description not for Pressure Injury Full thickness 23 1049   Number of days: 35       Total  Area  Debrided:  0.12 sq cm     Bleeding:  Minimal    Hemostasis Achieved:  by pressure    Procedural Pain:  0  / 10     Post Procedural Pain:  0 / 10     Response to treatment:  Well tolerated by

## 2023-08-29 NOTE — DISCHARGE INSTRUCTIONS
PHYSICIAN ORDERS AND DISCHARGE INSTRUCTIONS     NOTE: Upon discharge from the  Medical [facility], you will receive a patient experience survey. We would be grateful if you would take the time to fill this survey out. Wound care order history:                 MERON's   Right       Left               Date:              Cultures:                Grafts:                Antibiotics:           Continuing wound care orders and information:                 Residence:                Continue home health care with:               Your wound-care supplies will be provided by: Wound cleansing:               Do not scrub or use excessive force. Wash hands with soap and water before and after dressing changes. Prior to applying a clean dressing, cleanse wound with normal saline, wound cleanser, or mild soap and water. Ask the physician or nurse before getting the wound(s) wet in a shower. General Wound management:              Keep weight off wounds and reposition every 2 hours. Avoid standing for long periods of time. Apply wraps/stockings in AM and remove at bedtime. If swelling is present, elevate legs to the level of the heart or above for 30 minutes 4-5 times a day and/or when sitting. When taking antibiotics take entire prescription as ordered by physician do not stop taking until medicine is all gone. Increase protein intake to promote healing. Orders for this week: 23     Rx:     Lower Abdominal Wound () - Healed         Follow up with Dr Subha Pearl in the wound care center. If needed. Discharged   Call  for any questions or concerns.

## 2023-08-30 ENCOUNTER — HOSPITAL ENCOUNTER (OUTPATIENT)
Dept: WOUND CARE | Age: 33
Discharge: HOME | End: 2023-08-30

## 2023-08-30 VITALS
DIASTOLIC BLOOD PRESSURE: 74 MMHG | HEART RATE: 73 BPM | RESPIRATION RATE: 16 BRPM | SYSTOLIC BLOOD PRESSURE: 107 MMHG | TEMPERATURE: 97.8 F

## 2023-08-30 DIAGNOSIS — T81.31XD SURGICAL WOUND DEHISCENCE, SUBSEQUENT ENCOUNTER: Primary | ICD-10-CM

## 2023-08-30 NOTE — PROGRESS NOTES
Wound Care Center Progress Note       Nuvia Tran  AGE: 28 y.o. GENDER: female  : 1990  TODAY'S DATE:  2023        Subjective:     Chief Complaint   Patient presents with    Wound Check         HISTORY of PRESENT ILLNESS     Nuvia Tran is a 28 y.o. female who presents today for wound evaluation of Chronic non-healing surgical ulcer(s) of lower abdominal wall from C section. The ulcer is of mild severity. The underlying cause of the wound is nonhealing surgical.  Healed. Wound Pain Timing/Severity: none  Quality of pain: N/A  Severity of pain:  0 / 10   Modifying Factors: obesity  Associated Signs/Symptoms: none        PAST MEDICAL HISTORY        Diagnosis Date    Asthma     Bleeding postpartum     post partum hemmhorage    Ganglion and cyst of synovium, tendon and bursa 2012    Gestational diabetes     Hashimoto's disease     Hypothyroidism        PAST SURGICAL HISTORY    Past Surgical History:   Procedure Laterality Date     SECTION N/A 3/9/2019     SECTION performed by Joe Jacobson MD at [facility] L&D [address] [address] N/A 2023     SECTION performed by Joe Jacobson MD at [facility] L&D OR    CYST REMOVAL  12    right hand    [place]  2008    right lateral collateral ligament    FOREARM SURGERY  2007    right radial release    HAND SURGERY  [id number]    rt hand ganglion cyst removal    HARDWARE REMOVAL      right elbow    SHOULDER ARTHROSCOPY  2006    right repaired labrium    TOE SURGERY      right great toe bone spur shaved off.        FAMILY HISTORY    Family History   Problem Relation Age of Onset    Diabetes Mother     Other Mother         Thyroid    Allergies Maternal Grandmother     Tuberculosis Maternal Grandmother     Cancer Maternal Grandfather        SOCIAL HISTORY    Social History     Tobacco Use    Smoking status: Never    Smokeless tobacco: Never   Vaping Use    Vaping

## 2023-10-31 ENCOUNTER — HOSPITAL ENCOUNTER (OUTPATIENT)
Age: 33
Discharge: HOME | End: 2023-10-31

## 2023-10-31 LAB
HBV SURFACE AG SERPL QL IA: NON REACTIVE
HCV AB SERPL QL IA: NON REACTIVE
HIV 1+2 AB+HIV1P24 AG SERPLBLD IA.RAPID: NON REACTIVE

## 2024-04-30 ENCOUNTER — HOSPITAL ENCOUNTER (OUTPATIENT)
Age: 34
Discharge: HOME | End: 2024-04-30

## 2024-04-30 LAB
HBV SURFACE AG SERPL QL IA: NON REACTIVE
HCV AB SERPL QL IA: NON REACTIVE

## 2024-04-30 PROCEDURE — 86803 HEPATITIS C AB TEST: CPT

## 2024-04-30 PROCEDURE — 87389 HIV-1 AG W/HIV-1&-2 AB AG IA: CPT

## 2024-04-30 PROCEDURE — 36415 COLL VENOUS BLD VENIPUNCTURE: CPT

## 2024-04-30 PROCEDURE — 87340 HEPATITIS B SURFACE AG IA: CPT

## 2024-05-01 LAB — HIV 1+2 AB+HIV1P24 AG SERPLBLD IA.RAPID: NON REACTIVE

## 2025-08-02 RX ORDER — DICLOFENAC SODIUM 75 MG/1
75 TABLET, DELAYED RELEASE ORAL 2 TIMES DAILY
COMMUNITY